# Patient Record
Sex: MALE | Race: WHITE | Employment: FULL TIME | URBAN - METROPOLITAN AREA
[De-identification: names, ages, dates, MRNs, and addresses within clinical notes are randomized per-mention and may not be internally consistent; named-entity substitution may affect disease eponyms.]

---

## 2020-02-20 ENCOUNTER — TELEPHONE (OUTPATIENT)
Dept: SCHEDULING | Facility: IMAGING CENTER | Age: 35
End: 2020-02-20

## 2020-02-24 ENCOUNTER — OFFICE VISIT (OUTPATIENT)
Dept: BEHAVIORAL HEALTH | Facility: CLINIC | Age: 35
End: 2020-02-24
Payer: COMMERCIAL

## 2020-02-24 VITALS
HEIGHT: 69 IN | WEIGHT: 160 LBS | DIASTOLIC BLOOD PRESSURE: 63 MMHG | SYSTOLIC BLOOD PRESSURE: 123 MMHG | HEART RATE: 72 BPM | BODY MASS INDEX: 23.7 KG/M2

## 2020-02-24 DIAGNOSIS — F90.9 ATTENTION DEFICIT HYPERACTIVITY DISORDER (ADHD), UNSPECIFIED ADHD TYPE: ICD-10-CM

## 2020-02-24 PROCEDURE — 99204 OFFICE O/P NEW MOD 45 MIN: CPT | Performed by: PSYCHIATRY & NEUROLOGY

## 2020-02-24 PROCEDURE — 96127 BRIEF EMOTIONAL/BEHAV ASSMT: CPT | Performed by: PSYCHIATRY & NEUROLOGY

## 2020-02-24 SDOH — HEALTH STABILITY: MENTAL HEALTH: HOW OFTEN DO YOU HAVE A DRINK CONTAINING ALCOHOL?: MONTHLY OR LESS

## 2020-02-24 SDOH — HEALTH STABILITY: MENTAL HEALTH: HOW MANY STANDARD DRINKS CONTAINING ALCOHOL DO YOU HAVE ON A TYPICAL DAY?: 1 OR 2

## 2020-02-24 SDOH — HEALTH STABILITY: MENTAL HEALTH: HOW OFTEN DO YOU HAVE 6 OR MORE DRINKS ON ONE OCCASION?: NEVER

## 2020-02-24 ASSESSMENT — PATIENT HEALTH QUESTIONNAIRE - PHQ9
SUM OF ALL RESPONSES TO PHQ QUESTIONS 1-9: 10
5. POOR APPETITE OR OVEREATING: 1 - SEVERAL DAYS
CLINICAL INTERPRETATION OF PHQ2 SCORE: 3

## 2020-02-24 NOTE — PROGRESS NOTES
"INITIAL PSYCHIATRIC EVALUATION      This provider informed the patient their medical records are totally confidential except for the use by other providers involved in their care, or if the patient signs a release, or to report instances of child or elder abuse, or if it is determined they are an immediate risk to harm themselves or others.      CHIEF COMPLAINT  \"I am here for ADHD treatment\"    HISTORY OF PRESENT ILLNESS  Maximus Greenwood is a 34 y.o. old male with history of ADHD comes in today to establish care and for evaluation of ADHD.  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  Patient denies any symptoms consistent with depression, daya or psychosis and denies endorsing suicidal or homicidal ideations.  Patient continues to perseverate how he is having difficulty with every aspect of him attending this college in terms of him frequently leaving the classes due to restlessness, not able to focus beyond 20 minutes, not able to finish home works on time and is fearful of feeling.  Patient reports getting testing done at [facility] during his childhood time but do not have any results of testing now.  Patient understands that he will need to get her neuropsych testing done for ADHD evaluation and get urine drug screen and basic labs done before we can discuss treatment options.    Patient does report struggling with these symptoms and reports them as directly affecting his daily functioning.  Patient did arrive 5 minutes late and left without taking lab prescription despite my frequent education attempt.  I called him and he came back to pick his lab scripts.    Adult ADHD Self-Report Scale (ASRS-v1.1) Symptom    1. How often do you have trouble wrapping up the final details of a project, once the challenging parts have been done?: very often  2. How often do you have difficulty " getting things in order when you have to do a task that requires organization? very often  3. How often do you have problems remembering appointments or obligations? sometime  4. How often do you fidget or squirm with your hands or feet when you have to sit down for a long time? very often  6. How often do you feel overly active and compelled to do things, like you were driven by a motor? often  7. How often do you make careless mistakes when you have to work on a boring or difficult project? often  8. How often do you have difficulty keeping your attention when you are doing boring or repetitive work? very often  9. How often do you have difficulty concentrating on what people say to you, even when they are speaking to you directly? very often  10. How often do you misplace or have difficulty finding things at home or at work? often  11. How often are you distracted by activity or noise around you? rarely   12. How often do you leave your seat in meetings or other situations in which you are expected to remain seated? often  13. How often do you feel restless or fidgety? very often  14. How often do you have difficulty unwinding and relaxing when you have time to yourself? sometime  15. How often do you find yourself talking too much when you are in social situations? often  16. When you’re in a conversation, how often do you find yourself finishing the sentences of the people you are talking to, before they can finish them themselves? rarely  17. How often do you have difficulty waiting your turn in situations when turn taking is required? sometime   18. How often do you interrupt others when they are busy? sometime     Most part of the session was dedicated to educated patient on the process of evaluation before medication treatment can be considered.  Patient in agreement with getting neuropsych testing for ADHD evaluation and bringing any childhood ADHD testing records if available.  Patient also agreed with  getting testing including CBC, CMP and urine drug screen.  Patient reports using cannabis to improve his focus but is in agreement with reducing and stopping cannabis use of medication for ADHD is considered.    PSYCHIATRIC REVIEW OF SYSTEMS: see HPI for depressive symptoms, see HPI for anxeity symptoms, see HPI for manic symptoms, see HPI for psychotic symptoms and see HPI for trauma related symptoms      MEDICAL REVIEW OF SYSTEMS:   Constitutional negative   Eyes negative   Ears/Nose/Mouth/Throat negative   Cardiovascular negative   Respiratory negative   Gastrointestinal negative   Genitourinary negative   Muscular negative   Integumentary negative   Neurological negative   Endocrine negative   Hematologic/Lymphatic negative       PAST PSYCHIATRIC HISTORY  Prior psychiatric hospitalization: denies  Prior Self harm/suicide attempt: denies  Prior Diagnosis: ADHD        CURRENT MEDICATIONS:  Current Outpatient Medications   Medication Sig Dispense Refill   • erythromycin 5 MG/GM Ointment Place 1/4 ribbon to lower lid 3 times daily x 5 days. 1 Tube 0     No current facility-administered medications for this visit.        ALLERGIES:  Patient has no known allergies.      PAST PSYCHIATRIC MEDICATIONS  Ritalin: For stimulant since age 9  Adderall: Ritalin switched to Adderall till age 19 yr: describes as most effective   Focalin: Per patient made him depressed  Strattera: Caused flulike symptoms  Wellbutrin: Not effective for ADHD management  Desipramine    FAMILY HISTORY  Psychiatric diagnosis  none  History of suicide attempts  no  Substance abuse history  none      SUBSTANCE USE HISTORY:  ALCOHOL: occasionally  TOBACCO : denies  CANNABIS 1 bowl: 2-3 days/week  OPIOIDS : denies  PRESCRIPTION MEDICATIONS : denies  OTHERS : denies    History of inpatient/outpatient rehab treatment: none    SOCIAL HISTORY  Education: currently in college ([employer])  in Special Education: no  Intellectual Disability: no  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  No past surgical history on file.      CARE TEAM INFORMATION:  [facility]   · PCP - General, Family Medicine   · Since 4/15/2016   · [phone]      PHYSICAL EXAMINAION:  Vital signs: There were no vitals taken for this visit.  Musculoskeletal: Normal gait.   Abnormal movements: none        MENTAL STATUS EXAMINATION      General:   - Grooming and hygiene: Adequate and Casual,   - Apparent distress: no,   - Behavior: Calm  - Eye Contact:  Good,   - no psychomotor agitation or retardation    - Participation: Active verbal participation  Orientation: Alert and Fully Oriented to person, place and time  Mood: Euthymic  Affect: Full range,  Thought Process: Logical  Thought Content: Denies suicidal or homicidal ideations, intent or plan Within normal limits  Perception: Denies auditory or visual hallucinations. No delusions noted Within normal limits  Attention span and concentration: needs redirections  Speech:Rapid but redirectable  Language: Appropriate   Insight: Good  Judgment: Good  Recent and remote memory: No gross evidence of memory deficits      DEPRESSION SCREENING:  Depression Screen (PHQ-2/PHQ-9) 2/24/2020   PHQ-2 Total Score 3   PHQ-9 Total Score 10       Interpretation of PHQ-9 Total Score   Score Severity   1-4 No Depression   5-9 Mild Depression   10-14 Moderate Depression   15-19 Moderately Severe Depression   20-27 Severe Depression      CURRENT RISK:       Suicidal: Low       Homicidal: Low       Self-Harm: Low       Relapse: Low       Crisis Safety Plan Reviewed Not Indicated      MEDICAL RECORDS/LABS/DIAGNOSTIC TESTS REVIEWED:  [facility] records - reviewed    No Labs/Radiology found.     ASSESSMENT  Patient is a  34 year old male with history of ADHD presented for evaluation of ADHD.  Patient with dominance of inattentive ADHD symptoms during evaluation but will need neuropsych testing before medications can be considered.    DIFFERENTIAL DIAGNOSES  1. ADHD  2. Cannabis abuse      PLAN  1. I reviewed clinical lab tests done in last 1 year. Patient will need following lab test done: CBC with differential, CMP and urine drug screening (patient is given printed information for each)  2. Consult placed for neuropsych testing before medications can be considered.  Patient motivated to bring any childhood ADHD testing records from home if available.  3. Medication options, alternatives (including no medications) and medication risks/benefits/side effects were discussed in detail.  4. The patient was educated to call 911, call the suicide hotline, or go to local ER if having thoughts of suicide or homicide; verbalized understanding.      Return to clinic in 2 weeks or sooner if symptoms worsen    The proposed treatment plan was discussed with the patient who was provided the opportunity to ask questions and make suggestions regarding alternative treatment. Patient verbalized understanding and expressed agreement with the plan.     Total face-to-face time: 50 minutes  More than 50% of face-to-face time was not spent in counseling and coordinating care.    Thank you for allowing me to participate in the care of this patient.    Armando Kline M.D.  02/24/20    CC:   Teodoro Monzon, A.P.N.    This note was created using voice recognition software (Dragon). The accuracy of the dictation is limited by the abilities of the software. I have reviewed the note prior to signing, however some errors in grammar and context are still possible. If you have any questions related to this note please do not hesitate to contact our office.

## 2020-03-16 ENCOUNTER — HOSPITAL ENCOUNTER (OUTPATIENT)
Dept: LAB | Facility: MEDICAL CENTER | Age: 35
End: 2020-03-16
Attending: PSYCHIATRY & NEUROLOGY
Payer: COMMERCIAL

## 2020-03-16 ENCOUNTER — OFFICE VISIT (OUTPATIENT)
Dept: BEHAVIORAL HEALTH | Facility: CLINIC | Age: 35
End: 2020-03-16
Payer: COMMERCIAL

## 2020-03-16 DIAGNOSIS — F90.2 ATTENTION DEFICIT HYPERACTIVITY DISORDER (ADHD), COMBINED TYPE: ICD-10-CM

## 2020-03-16 DIAGNOSIS — F34.0 CYCLOTHYMIA: ICD-10-CM

## 2020-03-16 DIAGNOSIS — F90.9 ATTENTION DEFICIT HYPERACTIVITY DISORDER (ADHD), UNSPECIFIED ADHD TYPE: ICD-10-CM

## 2020-03-16 LAB
ALBUMIN SERPL BCP-MCNC: 4.6 G/DL (ref 3.2–4.9)
ALBUMIN/GLOB SERPL: 1.6 G/DL
ALP SERPL-CCNC: 60 U/L (ref 30–99)
ALT SERPL-CCNC: 16 U/L (ref 2–50)
AMPHET UR QL SCN: NEGATIVE
ANION GAP SERPL CALC-SCNC: 8 MMOL/L (ref 7–16)
AST SERPL-CCNC: 15 U/L (ref 12–45)
BARBITURATES UR QL SCN: NEGATIVE
BASOPHILS # BLD AUTO: 0.7 % (ref 0–1.8)
BASOPHILS # BLD: 0.04 K/UL (ref 0–0.12)
BENZODIAZ UR QL SCN: NEGATIVE
BILIRUB SERPL-MCNC: 0.7 MG/DL (ref 0.1–1.5)
BUN SERPL-MCNC: 12 MG/DL (ref 8–22)
BZE UR QL SCN: NEGATIVE
CALCIUM SERPL-MCNC: 10.2 MG/DL (ref 8.5–10.5)
CANNABINOIDS UR QL SCN: POSITIVE
CHLORIDE SERPL-SCNC: 103 MMOL/L (ref 96–112)
CO2 SERPL-SCNC: 26 MMOL/L (ref 20–33)
CREAT SERPL-MCNC: 1.02 MG/DL (ref 0.5–1.4)
EOSINOPHIL # BLD AUTO: 0.22 K/UL (ref 0–0.51)
EOSINOPHIL NFR BLD: 3.8 % (ref 0–6.9)
ERYTHROCYTE [DISTWIDTH] IN BLOOD BY AUTOMATED COUNT: 39.7 FL (ref 35.9–50)
GLOBULIN SER CALC-MCNC: 2.9 G/DL (ref 1.9–3.5)
GLUCOSE SERPL-MCNC: 100 MG/DL (ref 65–99)
HCT VFR BLD AUTO: 51.8 % (ref 42–52)
HGB BLD-MCNC: 17.8 G/DL (ref 14–18)
IMM GRANULOCYTES # BLD AUTO: 0.01 K/UL (ref 0–0.11)
IMM GRANULOCYTES NFR BLD AUTO: 0.2 % (ref 0–0.9)
LYMPHOCYTES # BLD AUTO: 1.96 K/UL (ref 1–4.8)
LYMPHOCYTES NFR BLD: 34 % (ref 22–41)
MCH RBC QN AUTO: 30.5 PG (ref 27–33)
MCHC RBC AUTO-ENTMCNC: 34.4 G/DL (ref 33.7–35.3)
MCV RBC AUTO: 88.9 FL (ref 81.4–97.8)
METHADONE UR QL SCN: NEGATIVE
MONOCYTES # BLD AUTO: 0.44 K/UL (ref 0–0.85)
MONOCYTES NFR BLD AUTO: 7.6 % (ref 0–13.4)
NEUTROPHILS # BLD AUTO: 3.09 K/UL (ref 1.82–7.42)
NEUTROPHILS NFR BLD: 53.7 % (ref 44–72)
NRBC # BLD AUTO: 0 K/UL
NRBC BLD-RTO: 0 /100 WBC
OPIATES UR QL SCN: NEGATIVE
OXYCODONE UR QL SCN: NEGATIVE
PCP UR QL SCN: NEGATIVE
PLATELET # BLD AUTO: 289 K/UL (ref 164–446)
PMV BLD AUTO: 9.6 FL (ref 9–12.9)
POTASSIUM SERPL-SCNC: 4.3 MMOL/L (ref 3.6–5.5)
PROPOXYPH UR QL SCN: NEGATIVE
PROT SERPL-MCNC: 7.5 G/DL (ref 6–8.2)
RBC # BLD AUTO: 5.83 M/UL (ref 4.7–6.1)
SODIUM SERPL-SCNC: 137 MMOL/L (ref 135–145)
WBC # BLD AUTO: 5.8 K/UL (ref 4.8–10.8)

## 2020-03-16 PROCEDURE — 85025 COMPLETE CBC W/AUTO DIFF WBC: CPT

## 2020-03-16 PROCEDURE — 96130 PSYCL TST EVAL PHYS/QHP 1ST: CPT | Performed by: PSYCHOLOGIST

## 2020-03-16 PROCEDURE — 96131 PSYCL TST EVAL PHYS/QHP EA: CPT | Performed by: PSYCHOLOGIST

## 2020-03-16 PROCEDURE — 80307 DRUG TEST PRSMV CHEM ANLYZR: CPT

## 2020-03-16 PROCEDURE — 96137 PSYCL/NRPSYC TST PHY/QHP EA: CPT | Performed by: PSYCHOLOGIST

## 2020-03-16 PROCEDURE — 96136 PSYCL/NRPSYC TST PHY/QHP 1ST: CPT | Performed by: PSYCHOLOGIST

## 2020-03-16 PROCEDURE — 80053 COMPREHEN METABOLIC PANEL: CPT

## 2020-03-16 PROCEDURE — 36415 COLL VENOUS BLD VENIPUNCTURE: CPT

## 2020-03-19 NOTE — PROGRESS NOTES
PSYCHIATRY FOLLOW-UP NOTE      Name: Maximus Greenwood  MRN: [id number]  : 1985  Age: 34 y.o.  Date of assessment: 3/19/2020  PCP: F[facility]tr[facility]  Persons in attendance: Patient  Total face-to-face time: 30 minutes    REASON FOR VISIT/CHIEF COMPLAINT (as stated by Patient):  Maximus Greenwood is a 34 y.o., White male, attending follow-up appointment for inattention and hyperactivity management.      HISTORY OF PRESENT ILLNESS:  Maximus Greenwood is a 34 y.o. old male comes in today for follow up, was last seen for an initial evaluation 2020 (3 weeks ago).  Patient initially came with the goal of getting Adderall for ADHD treatment.  Patient has agreed with plan of getting neuropsychological testing and basic lab work (including CBC, CMP and urine drug screening) done.    Patient has performed these activities and his neuropsychiatric testing results are available: We discussed how the tests tested are showing presence of underlying cyclothymia with no definite diagnosis for ADHD.  We discussed the importance of managing mood first by adding mood stabilizer first and assessing if symptoms of inattention and hyperactivity improves.  Discussed the impact of initiating stimulant alone and increasing the risk of further mood destabilization and worsening inattention and hyperactivity symptoms.  Patient initially got angry and loud for not getting the prescription for stimulant.  Patient later apologized and is in agreement with considering mood stabilizer first and then considering stimulant if indicated.  Patient is concerned that he will fail this semester.  Patient was again educated extensively on importance of mood stabilization first otherwise we carry the risk of destabilizing mood further. Patient is currently denying symptoms of depression and denies endorsing suicidal or homicidal ideation.    His PHQ 9 today is 5 indicating no depression.  His NELY 7 is 5 indicating no anxiety.    Discussed  about lithium in detail and importance of taking this medication on a daily basis and checking lithium level after 1 week of consistent dosing.  Patient is educated on risk of lithium toxicity with drug interaction and taking more than recommended medications.  Patient continues to verbalize that this medication will not help him.  Patient again educated on importance of mood stabilization first and impact of prescribing stimulant with cyclothymia.      CURRENT MEDICATIONS:  No current outpatient medications on file.     No current facility-administered medications for this visit.        MEDICAL HISTORY  Past Medical History:   Diagnosis Date   • ADD (attention deficit disorder)      No past surgical history on file.      PAST PSYCHIATRIC HISTORY  Prior psychiatric hospitalization: denies  Prior Self harm/suicide attempt: denies  Prior Diagnosis: ADHD    PAST PSYCHIATRIC MEDICATIONS  Ritalin: For stimulant since age 9  Adderall: Ritalin switched to Adderall till age 19 yr: describes as most effective   Focalin: Per patient made him depressed  Strattera: Caused flulike symptoms  Wellbutrin: Not effective for ADHD management  Desipramine     FAMILY HISTORY  Psychiatric diagnosis  none  History of suicide attempts  no  Substance abuse history  none     SUBSTANCE USE HISTORY:  ALCOHOL: occasionally  TOBACCO : denies  CANNABIS 1 bowl: 2-3 days/week  OPIOIDS : denies  PRESCRIPTION MEDICATIONS : denies  OTHERS : denies  History of inpatient/outpatient rehab treatment: none     SOCIAL HISTORY  Education: currently in college (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  Relationship:  for 2 years  Kids: no  Current living situation: lives with wife  Current/past legal issues: denies  History of emotional/physical/sexual abuse - no   History: no      REVIEW OF SYSTEMS:        Constitutional negative   Eyes negative   Ears/Nose/Mouth/Throat  "negative   Cardiovascular negative   Respiratory negative   Gastrointestinal negative   Genitourinary negative   Muscular negative   Integumentary negative   Neurological negative   Endocrine negative   Hematologic/Lymphatic negative     PHYSICAL EXAMINAION:  Vital signs: /70 (BP Location: Left arm, Patient Position: Sitting)   Pulse 72   Ht 1.753 m (5' 9\")   Wt 73.5 kg (162 lb)   BMI 23.92 kg/m²   Musculoskeletal: Normal gait.   Abnormal movements: none      MENTAL STATUS EXAMINATION      General:   - Grooming and hygiene: Disheveled,   - Apparent distress: none,   - Behavior: Tense  - Eye Contact:  Good,   - no psychomotor agitation or retardation    - Participation: Active verbal participation  Orientation: Alert and Fully Oriented to person, place and time  Mood: Anxious  Affect: Full range,  Thought Process: Goal-directed  Thought Content: Denies suicidal or homicidal ideations, intent or plan Within normal limits  Perception: Denies auditory or visual hallucinations. No delusions noted Within normal limits  Attention span and concentration: Intact   Speech:Rate within normal limits and Volume within normal limits  Language: Appropriate   Insight: Limited  Judgment: Adequate  Recent and remote memory: No gross evidence of memory deficits        DEPRESSION SCREENING:  Depression Screen (PHQ-2/PHQ-9) 2/24/2020 3/20/2020   PHQ-2 Total Score 3 1   PHQ-9 Total Score 10 5       Interpretation of PHQ-9 Total Score   Score Severity   1-4 No Depression   5-9 Mild Depression   10-14 Moderate Depression   15-19 Moderately Severe Depression   20-27 Severe Depression    CURRENT RISK:       Suicidal: Low       Homicidal: Low       Self-Harm: Low       Relapse: Low       Crisis Safety Plan Reviewed Not Indicated         MEDICAL RECORDS/LABS/DIAGNOSTIC TESTS REVIEWED:    CBC with Diff:  Component      Latest Ref Rng & Units 3/16/2020          11:21 AM   WBC      4.8 - 10.8 K/uL 5.8   RBC      4.70 - 6.10 M/uL 5.83 "   Hemoglobin      14.0 - 18.0 g/dL 17.8   Hematocrit      42.0 - 52.0 % 51.8   MCV      81.4 - 97.8 fL 88.9   MCH      27.0 - 33.0 pg 30.5   MCHC      33.7 - 35.3 g/dL 34.4   RDW      35.9 - 50.0 fL 39.7   Platelet Count      164 - 446 K/uL 289   MPV      9.0 - 12.9 fL 9.6   Neutrophils-Polys      44.00 - 72.00 % 53.70   Lymphocytes      22.00 - 41.00 % 34.00   Monocytes      0.00 - 13.40 % 7.60   Eosinophils      0.00 - 6.90 % 3.80   Basophils      0.00 - 1.80 % 0.70   Immature Granulocytes      0.00 - 0.90 % 0.20   Nucleated RBC      /100 WBC 0.00   Neutrophils (Absolute)      1.82 - 7.42 K/uL 3.09   Lymphs (Absolute)      1.00 - 4.80 K/uL 1.96   Monos (Absolute)      0.00 - 0.85 K/uL 0.44   Eos (Absolute)      0.00 - 0.51 K/uL 0.22   Baso (Absolute)      0.00 - 0.12 K/uL 0.04   Immature Granulocytes (abs)      0.00 - 0.11 K/uL 0.01   NRBC (Absolute)      K/uL 0.00     CMP:  Component      Latest Ref Rng & Units 3/16/2020          11:21 AM   Sodium      135 - 145 mmol/L 137   Potassium      3.6 - 5.5 mmol/L 4.3   Chloride      96 - 112 mmol/L 103   Co2      20 - 33 mmol/L 26   Anion Gap      7.0 - 16.0 8.0   Glucose      65 - 99 mg/dL 100 (H)   Bun      8 - 22 mg/dL 12   Creatinine      0.50 - 1.40 mg/dL 1.02   Calcium      8.5 - 10.5 mg/dL 10.2   AST(SGOT)      12 - 45 U/L 15   ALT(SGPT)      2 - 50 U/L 16   Alkaline Phosphatase      30 - 99 U/L 60   Total Bilirubin      0.1 - 1.5 mg/dL 0.7   Albumin      3.2 - 4.9 g/dL 4.6   Total Protein      6.0 - 8.2 g/dL 7.5   Globulin      1.9 - 3.5 g/dL 2.9   A-G Ratio      g/dL 1.6     Urine Drug Screen:  Component      Latest Ref Rng & Units 3/16/2020          11:21 AM   Amphetamines Urine      Negative Negative   Barbiturates      Negative Negative   Benzodiazepines      Negative Negative   Cocaine Metabolite      Negative Negative   Methadone      Negative Negative   Opiates      Negative Negative   Oxycodone      Negative Negative   Phencyclidine -Pcp      Negative  Negative   Propoxyphene      Negative Negative   Cannabinoid Metab      Negative Positive (A)       [facility] records -   [id number]   No data.      DIAGNOSTIC IMPRESSION(S):  · Mood disorder rule out bipolar 2 disorder versus cyclothymia  · Rule out comorbid ADHD  · Cannabis abuse      ASSESSMENT & PLAN:  (1) Mood disorder rule out Bipolar 2 disorder versus cyclothymia  • Add Lithium 600 mg daily after dinner for mood stabilization. Patient is given 1 month supply with 1 refill.  • Get lithium level after 1 week of lithium dosing.  Patient educated on importance of timeline for lithium level monitoring.  • Medication options, alternatives (including no medications) and medication risks/benefits/side effects were discussed in detail.  • The patient was advised to call, message provider on [employer], or come in to the clinic if symptoms worsen or if any future questions/issues regarding their medications arise; the patient verbalized understanding and agreement.  • The patient was educated to call 911, call the suicide hotline, or go to local ER if having thoughts of suicide or homicide; verbalized understanding.    (2) rule out co-morbid ADHD:  · Plan is mood stabilization first and assessing if symptoms of inattention and hyperactivity improves and plan further treatment accordingly.    (3) Cannabis abuse:  · Patient motivated to consider reduction and discontinuation of cannabis use.    Return to clinic in 4 weeks or sooner if symptoms worsen    The proposed treatment plan was discussed with the patient who was provided the opportunity to ask questions and make suggestions regarding alternative treatment. Patient verbalized understanding and expressed agreement with the plan.     More than 50% of face-to-face time was not spent in counseling and coordinating care.   Topics addressed in psychotherapy include:   · Extensive education given on importance of mood stabilization with cyclothymia.      (If greater than 16  minutes, add 08827 code)     Armando Kline M.D.  Armando Kline M.D.  03/20/20    This note was created using voice recognition software (Dragon). The accuracy of the dictation is limited by the abilities of the software. I have reviewed the note prior to signing, however some errors in grammar and context are still possible. If you have any questions related to this note please do not hesitate to contact our office.

## 2020-03-20 ENCOUNTER — OFFICE VISIT (OUTPATIENT)
Dept: BEHAVIORAL HEALTH | Facility: CLINIC | Age: 35
End: 2020-03-20
Payer: COMMERCIAL

## 2020-03-20 VITALS
HEART RATE: 72 BPM | HEIGHT: 69 IN | SYSTOLIC BLOOD PRESSURE: 110 MMHG | BODY MASS INDEX: 23.99 KG/M2 | DIASTOLIC BLOOD PRESSURE: 70 MMHG | WEIGHT: 162 LBS

## 2020-03-20 DIAGNOSIS — F90.2 ATTENTION DEFICIT HYPERACTIVITY DISORDER (ADHD), COMBINED TYPE: ICD-10-CM

## 2020-03-20 DIAGNOSIS — F34.0 CYCLOTHYMIA: Primary | ICD-10-CM

## 2020-03-20 PROCEDURE — 96127 BRIEF EMOTIONAL/BEHAV ASSMT: CPT | Performed by: PSYCHIATRY & NEUROLOGY

## 2020-03-20 PROCEDURE — 99214 OFFICE O/P EST MOD 30 MIN: CPT | Performed by: PSYCHIATRY & NEUROLOGY

## 2020-03-20 RX ORDER — LITHIUM CARBONATE 600 MG/1
600 CAPSULE ORAL
Qty: 90 CAP | Refills: 1 | Status: SHIPPED | OUTPATIENT
Start: 2020-03-20 | End: 2020-04-15

## 2020-03-20 RX ORDER — ALBUTEROL SULFATE 90 UG/1
2 AEROSOL, METERED RESPIRATORY (INHALATION) EVERY 8 HOURS PRN
Qty: 2 INHALER | Refills: 1 | Status: SHIPPED | OUTPATIENT
Start: 2020-03-20 | End: 2021-02-21

## 2020-03-20 ASSESSMENT — FIBROSIS 4 INDEX: FIB4 SCORE: 0.42

## 2020-03-20 ASSESSMENT — PATIENT HEALTH QUESTIONNAIRE - PHQ9
CLINICAL INTERPRETATION OF PHQ2 SCORE: 1
SUM OF ALL RESPONSES TO PHQ QUESTIONS 1-9: 5
5. POOR APPETITE OR OVEREATING: 0 - NOT AT ALL

## 2020-03-20 ASSESSMENT — ANXIETY QUESTIONNAIRES
6. BECOMING EASILY ANNOYED OR IRRITABLE: NOT AT ALL
7. FEELING AFRAID AS IF SOMETHING AWFUL MIGHT HAPPEN: MORE THAN HALF THE DAYS
5. BEING SO RESTLESS THAT IT IS HARD TO SIT STILL: NOT AT ALL
GAD7 TOTAL SCORE: 5
1. FEELING NERVOUS, ANXIOUS, OR ON EDGE: SEVERAL DAYS
4. TROUBLE RELAXING: NOT AT ALL
3. WORRYING TOO MUCH ABOUT DIFFERENT THINGS: SEVERAL DAYS
2. NOT BEING ABLE TO STOP OR CONTROL WORRYING: SEVERAL DAYS

## 2020-04-10 ENCOUNTER — HOSPITAL ENCOUNTER (OUTPATIENT)
Dept: LAB | Facility: MEDICAL CENTER | Age: 35
End: 2020-04-10
Attending: PSYCHIATRY & NEUROLOGY
Payer: COMMERCIAL

## 2020-04-10 ENCOUNTER — OFFICE VISIT (OUTPATIENT)
Dept: URGENT CARE | Facility: PHYSICIAN GROUP | Age: 35
End: 2020-04-10
Payer: COMMERCIAL

## 2020-04-10 VITALS
BODY MASS INDEX: 24.25 KG/M2 | OXYGEN SATURATION: 96 % | RESPIRATION RATE: 16 BRPM | WEIGHT: 160 LBS | TEMPERATURE: 97.6 F | DIASTOLIC BLOOD PRESSURE: 90 MMHG | HEART RATE: 88 BPM | HEIGHT: 68 IN | SYSTOLIC BLOOD PRESSURE: 124 MMHG

## 2020-04-10 DIAGNOSIS — L03.031 CELLULITIS OF TOE OF RIGHT FOOT: ICD-10-CM

## 2020-04-10 DIAGNOSIS — F34.0 CYCLOTHYMIA: ICD-10-CM

## 2020-04-10 LAB — LITHIUM SERPL-MCNC: 0.6 MMOL/L (ref 0.6–1.2)

## 2020-04-10 PROCEDURE — 99214 OFFICE O/P EST MOD 30 MIN: CPT | Performed by: NURSE PRACTITIONER

## 2020-04-10 PROCEDURE — 36415 COLL VENOUS BLD VENIPUNCTURE: CPT

## 2020-04-10 PROCEDURE — 80178 ASSAY OF LITHIUM: CPT

## 2020-04-10 RX ORDER — CEPHALEXIN 500 MG/1
500 CAPSULE ORAL 4 TIMES DAILY
Qty: 28 CAP | Refills: 0 | Status: SHIPPED | OUTPATIENT
Start: 2020-04-10 | End: 2020-04-17

## 2020-04-10 ASSESSMENT — ENCOUNTER SYMPTOMS
PALPITATIONS: 0
TINGLING: 0
HEARTBURN: 0
SHORTNESS OF BREATH: 0
MYALGIAS: 0
DIARRHEA: 0
CHILLS: 0
HEADACHES: 0
DIZZINESS: 0
MEMORY LOSS: 0
COUGH: 0
FEVER: 0
CONSTIPATION: 0
VOMITING: 0
BACK PAIN: 0
NAUSEA: 0
ORTHOPNEA: 0
WHEEZING: 0
SORE THROAT: 0

## 2020-04-10 ASSESSMENT — PAIN SCALES - GENERAL: PAINLEVEL: 6=MODERATE PAIN

## 2020-04-10 ASSESSMENT — FIBROSIS 4 INDEX: FIB4 SCORE: 0.42

## 2020-04-10 NOTE — PROGRESS NOTES
"Subjective:      Maximus Greenwood is a 34 y.o. male who presents with Toe Pain (L small toe, swollen, x1 week)            Patient presents to urgent care with complaint of fifth right toe pain that started approximately 4 days ago.  Patient reports no traumatic injury to the toe.  He reports that he had a crack underneath his toe and since that time he has had increased pain, swelling and tenderness to the toe.  He denies any fevers, chills, numbness, tingling.  He has not tried anything for this.  Walking aggravates the pain.  There are no alleviating factors.      Review of Systems   Constitutional: Negative for chills and fever.   HENT: Negative for ear pain and sore throat.    Respiratory: Negative for cough, shortness of breath and wheezing.    Cardiovascular: Negative for chest pain, palpitations, orthopnea and leg swelling.   Gastrointestinal: Negative for constipation, diarrhea, heartburn, nausea and vomiting.   Musculoskeletal: Negative for back pain, joint pain and myalgias.        Right 5th toe pain and swelling.   Skin: Negative for rash.   Neurological: Negative for dizziness, tingling and headaches.   Psychiatric/Behavioral: Negative for memory loss and suicidal ideas.   All other systems reviewed and are negative.         Objective:     /90   Pulse 88   Temp 36.4 °C (97.6 °F) (Temporal)   Resp 16   Ht 1.727 m (5' 8\")   Wt 72.6 kg (160 lb)   SpO2 96%   BMI 24.33 kg/m²      Physical Exam  Musculoskeletal:        Feet:    Feet:      Right foot:      Skin integrity: Erythema, warmth, dry skin and fissure present.      Toenail Condition: Right toenails are normal.      Left foot:      Skin integrity: Skin integrity normal.                 Assessment/Plan:       1. Cellulitis of toe of right foot  Differential diagnosis, natural history, supportive care, and indications for immediate follow-up discussed at length.     - cephALEXin (KEFLEX) 500 MG Cap; Take 1 Cap by mouth 4 times a day for 7 " days.  Dispense: 28 Cap; Refill: 0    Instructed to return to  or nearest emergency department if symptoms fail to improve, for any change in condition, further concerns, or new concerning symptoms.  Patient states understanding of the plan of care and discharge instructions.

## 2020-04-14 NOTE — PROGRESS NOTES
This encounter was conducted via Zoom .   Verbal consent was obtained. Patient's identity was verified.    PSYCHIATRY FOLLOW-UP NOTE      Name: Maximus Greenwood  MRN: [id number]  : 1985  Age: 34 y.o.  Date of assessment: 2020  PCP: FANY Beltran  Persons in attendance: Patient  Total face-to-face time: 15 minutes    REASON FOR VISIT/CHIEF COMPLAINT (as stated by Patient):  Maximus Greenwood is a 34 y.o., White male, attending follow-up appointment for mood and inattention management.      HISTORY OF PRESENT ILLNESS:  Maximus Greenwood is a 34 y.o. old male seen today for follow up. He was last seen on 3/20/20 and following treatment planning was recommended:  · Add Lithium 600 mg daily after dinner for mood stabilization. Patient is given 1 month supply with 1 refill.  · Get lithium level after 1 week of lithium dosing.  Patient educated on importance of timeline for lithium level monitoring.  · Plan is mood stabilization first and assessing if symptoms of inattention and hyperactivity improves and plan further treatment accordingly.    His recent Lithium level is 0.6.  He is compliant with lithium and reports having mild shakes which are not intrusive or affecting his ability to function on a daily basis.  Reports mood as stable and describes his focus and attention not improving on lithium.  He did agreed with plan of reducing lithium from 600 mg dose to 450 mg dose and considering the addition of Adderall for inattention and hyperactivity management.  Discussed the plan of initiating Adderall at low-dose of 10 mg and monitoring closely for not only improvement in inattention and hyperactivity but also for any worsening of mood symptoms.  Patient reports understanding and also agreed to sign the controlled medication consent form on Friday, when he will come to the clinic to pick this medication prescription.  She is denying suicidal or homicidal ideation and reports doing well in terms of  having a job in this coronavirus crisis.    He was again educated on risk of lithium toxicity with dehydration and over-the-counter medication.  He verbalized understanding and is mindful of these drug interactions.  Is currently denying any signs or symptoms consistent with lithium toxicity.      RESPONSE TO TREATMENT:  Slow improvement      MEDICATION SIDE EFFECTS:  Shakes from tremors      CURRENT MEDICATIONS:  Current Outpatient Medications   Medication Sig Dispense Refill   • cephALEXin (KEFLEX) 500 MG Cap Take 1 Cap by mouth 4 times a day for 7 days. 28 Cap 0   • lithium 600 MG capsule Take 1 Cap by mouth ONE-HALF HOUR AFTER DINNER. 90 Cap 1   • albuterol (VENTOLIN HFA) 108 (90 Base) MCG/ACT Aero Soln inhalation aerosol Inhale 2 Puffs by mouth every 8 hours as needed for Shortness of Breath. 2 Inhaler 1     No current facility-administered medications for this visit.          MEDICAL HISTORY  Past Medical History:   Diagnosis Date   • ADD (attention deficit disorder)      No past surgical history on file.    PAST PSYCHIATRIC HISTORY  Prior psychiatric hospitalization: denies  Prior Self harm/suicide attempt: denies  Prior Diagnosis: ADHD     PAST PSYCHIATRIC MEDICATIONS  Ritalin: For stimulant since age 9  Adderall: Ritalin switched to Adderall till age 19 yr: describes as most effective   Focalin: Per patient made him depressed  Strattera: Caused flulike symptoms  Wellbutrin: Not effective for ADHD management  Desipramine     FAMILY HISTORY  Psychiatric diagnosis  none  History of suicide attempts  no  Substance abuse history  none     SUBSTANCE USE HISTORY:  ALCOHOL: occasionally  TOBACCO : denies  CANNABIS 1 bowl: 2-3 days/week  OPIOIDS : denies  PRESCRIPTION MEDICATIONS : denies  OTHERS : denies  History of inpatient/outpatient rehab treatment: none     SOCIAL HISTORY  Education: currently in college (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6 kg (160 lb) Comment: Patient stated  BMI 23.63 kg/m²   Musculoskeletal: Normal gait.   Abnormal movements: none noted (reports mild tremors on hand extension)      MENTAL STATUS EXAMINATION      General:   - Grooming and hygiene: Casual,   - Apparent distress: none,   - Behavior: Calm  - Eye Contact:  Good,   - no psychomotor agitation or retardation    - Participation: Active verbal participation  Orientation: Alert and Fully Oriented to person, place and time  Mood: Euthymic  Affect: Flexible,  Thought Process: Logical and Goal-directed  Thought Content: Denies suicidal or homicidal ideations, intent or plan Within normal limits  Perception: Denies auditory or visual hallucinations. No delusions noted Within normal limits  Attention span and concentration: Intact   Speech:Rate within normal limits  Language: Appropriate   Insight: Adequate  Judgment: Adequate  Recent and remote memory: No gross evidence of memory deficits        DEPRESSION SCREENING:  Depression Screen (PHQ-2/PHQ-9) 2/24/2020 3/20/2020   PHQ-2 Total Score 3 1   PHQ-9 Total Score 10 5       Interpretation of PHQ-9 Total Score   Score Severity   1-4 No Depression   5-9 Mild Depression   10-14 Moderate Depression   15-19 Moderately Severe Depression   20-27 Severe Depression    CURRENT RISK:       Suicidal: Low       " Homicidal: Low       Self-Harm: Low       Relapse: Low       Crisis Safety Plan Reviewed Not Indicated      MEDICAL RECORDS/LABS/DIAGNOSTIC TESTS REVIEWED:    Component      Latest Ref Rng & Units 4/10/2020          12:14 PM   Lithium      0.6 - 1.2 mmol/L 0.6       [facility] records -   [id number]   No data.       DIAGNOSTIC IMPRESSION(S):  · Mood disorder rule out bipolar 2 disorder versus cyclothymia  · Rule out comorbid ADHD  · Cannabis abuse        ASSESSMENT & PLAN:  (1) Mood disorder rule out Bipolar 2 disorder versus cyclothymia  · Reduce Lithium to 450 mg daily after dinner for mood stabilization for shakes at 600 mg dose. Patient was given 1 month supply with 1 refill.  · Medication options, alternatives (including no medications) and medication risks/benefits/side effects were discussed in detail.  · The patient was advised to call, message provider on [employer], or come in to the clinic if symptoms worsen or if any future questions/issues regarding their medications arise; the patient verbalized understanding and agreement.  · The patient was educated to call 911, call the suicide hotline, or go to local ER if having thoughts of suicide or homicide; verbalized understanding.     (2) ADHD:  · Add Adderall XR 10 mg daily for inattention and hyperactivity management.  He will pick the prescription from clinic on Friday (4/17/19)     (3) Cannabis abuse:  · Patient motivated to consider reduction and discontinuation of cannabis use.      Return to clinic in 4 weeks or sooner if symptoms worsen.  Next Appointment: May 20 at 10 am.    The proposed treatment plan was discussed with the patient who was provided the opportunity to ask questions and make suggestions regarding alternative treatment. Patient verbalized understanding and expressed agreement with the plan.     Armando Kline M.D.  04/15/20    This note was created using voice recognition software (Dragon). The accuracy of the dictation is limited by the  abilities of the software. I have reviewed the note prior to signing, however some errors in grammar and context are still possible. If you have any questions related to this note please do not hesitate to contact our office.

## 2020-04-15 ENCOUNTER — TELEMEDICINE (OUTPATIENT)
Dept: BEHAVIORAL HEALTH | Facility: CLINIC | Age: 35
End: 2020-04-15
Payer: COMMERCIAL

## 2020-04-15 VITALS — BODY MASS INDEX: 23.7 KG/M2 | HEIGHT: 69 IN | WEIGHT: 160 LBS

## 2020-04-15 DIAGNOSIS — F34.0 CYCLOTHYMIC DISORDER: ICD-10-CM

## 2020-04-15 DIAGNOSIS — F90.2 ATTENTION DEFICIT HYPERACTIVITY DISORDER (ADHD), COMBINED TYPE: ICD-10-CM

## 2020-04-15 PROCEDURE — 99213 OFFICE O/P EST LOW 20 MIN: CPT | Mod: 95,CR | Performed by: PSYCHIATRY & NEUROLOGY

## 2020-04-15 RX ORDER — LITHIUM CARBONATE 450 MG
450 TABLET, EXTENDED RELEASE ORAL
Qty: 30 TAB | Refills: 1 | Status: SHIPPED | OUTPATIENT
Start: 2020-04-15 | End: 2020-05-20 | Stop reason: SDUPTHER

## 2020-04-15 ASSESSMENT — FIBROSIS 4 INDEX: FIB4 SCORE: 0.42

## 2020-04-17 RX ORDER — DEXTROAMPHETAMINE SACCHARATE, AMPHETAMINE ASPARTATE MONOHYDRATE, DEXTROAMPHETAMINE SULFATE AND AMPHETAMINE SULFATE 2.5; 2.5; 2.5; 2.5 MG/1; MG/1; MG/1; MG/1
10 CAPSULE, EXTENDED RELEASE ORAL EVERY MORNING
Qty: 30 CAP | Refills: 0 | Status: SHIPPED | OUTPATIENT
Start: 2020-04-17 | End: 2020-05-17

## 2020-05-13 ENCOUNTER — TELEMEDICINE (OUTPATIENT)
Dept: BEHAVIORAL HEALTH | Facility: CLINIC | Age: 35
End: 2020-05-13
Payer: COMMERCIAL

## 2020-05-13 DIAGNOSIS — F90.2 ATTENTION DEFICIT HYPERACTIVITY DISORDER (ADHD), COMBINED TYPE: ICD-10-CM

## 2020-05-13 DIAGNOSIS — F34.0 CYCLOTHYMIC DISORDER: ICD-10-CM

## 2020-05-13 PROCEDURE — 90834 PSYTX W PT 45 MINUTES: CPT | Mod: 95,CR | Performed by: PSYCHOLOGIST

## 2020-05-13 NOTE — BH THERAPY
[facility]  Therapy Progress Note  Met with the patient per their request via (HIPPA compliant) Zoom video conference to accommodate state imposed restrictions on social contact due to the COVID-19 pandemic.      Patient Name: Maximus Greenwood  Patient MRN: [id number]  Today's Date: 5/13/2020     Type of session:Individual psychotherapy  Length of session: 38 minutes  Persons in attendance:Patient    Subjective/New Info: The patient ID/Chief Complaint:  The patient is a 34 year old male, , .  The patient self-referred and voluntarily presented for an individual intake to address.  Reviewed limits of confidentiality and [facility] policies; patient expressed understanding and agreed to voluntarily proceed with evaluation and treatment.    Interval History:   Session Focus: The patient's estimated global assessment of functioning indicates a mild level of difficulty with some problems in relationships, work, or school functioning. The patient is nonetheless functioning well and has some significant relationships.  Continue to provide patient education about cyclothymia versus bipolar 2 disorder.  The patient was able to identify some goals specifically related to planning and execution.  He denies concerns related to impulsivity.  The patient reported that he will complete the semester and that he only had to drop out of 1 class as a result of it being too difficult and a virtual format.      Therapeutic Intervention: Cognitive Behavioral Therapy      Planned Intervention: Problem solving and decision-making skills      Objective/Observations:    Patient did not present in acute distress. Patient was appropriately groomed. Patient was alert and oriented x4. Eye contact was appropriate. No abnormalities in attention or concentration were noted. No abnormalities of movement present; psychomotor activity was normal. Speech was fluent and regular in rhythm, rate, volume,  and tone. Thought processes linear, logical and goal directed. There was no evidence of thought disorder. No auditory or visual hallucinations. Long and short term memory appeared to be intact. Insight, judgment, and impulse control were deemed to be good.  Reported mood was “concerned.” Affect was full-ranging and appropriate to thought content and conversation.  Patient denied past and current suicidal and homicidal ideation in plan, intent, and preparatory behavior.      Diagnoses:   1. Cyclothymia    2. Attention deficit hyperactivity disorder (ADHD), combined type         The patient denied any suicide-related ideation and/or behaviors and intent/plan, denied thoughts about death and dying both in session and during the period since last appointment, or past 2 weeks.    Risk Level: Not Currently at Clinically Significant Risk  Hospitalization is not deemed necessary at this time as the patient does not present a clear or imminent danger to self or others. No indication for pursuing higher level of care. Outpatient management is currently most appropriate and least restrictive level of care.    Current risk:   SUICIDE: Low   Homicide: Not applicable   Self-harm: Not applicable   Relapse: Not applicable   Other:    Safety Plan reviewed? No   If evidence of imminent risk is present, intervention/plan:     Treatment Goal(s)/Objective(s) addressed:     Goal: Reduce symptoms of hypomania and/or daya  Explore feelings and thoughts about self, his or her own abilities, and future plans.  Stop or reduce self destructive behaviors such as promiscuity, substance abuse, and the expression of overt hostility or aggression.  Learn to speak more slowly and be more subject oriented.  Learn to dress and groom in a less attention grabbing manner.  Increase control over thoughts and a slower thinking process.  Increase ability to stay focused on a single activity to completion.  Increase an understanding that behavior and  judgment are under poor control during episodes.  Increase acceptance of the need for ongoing supportive treatment and medication to reduce or eliminate destructive, manic swings.      Progress toward Treatment Goals: No change    Plan:  1) The patient will return to the clinic 2-3 weeks.  2) Crisis Response Plan:  Reviewed emergency resources with the patient and the patient expressed understanding including:  If feeling suicidal, patient will call or present to the [facility] during duty hours or present to closest ED ([facility] or [facility], call 911 or crisis hotline (3-138-278-[facility]) after duty hours.  3) Referrals/Consults:  N/A  4) Barriers to Learning:  No  5) Readiness to Learn:  Yes  6) Cultural Concerns:  No  7) Patient voiced understanding of, and agreement with, plan and goals as annotated above.  8) Declare these services are medically necessary and appropriate to the patient’s diagnosis and needs  9) The point of contact at the [facility] regarding this evaluation is Dr. Booth, Psychologist.    Juarez Booth III, Ed.D.  5/13/2020

## 2020-05-20 ENCOUNTER — TELEMEDICINE (OUTPATIENT)
Dept: BEHAVIORAL HEALTH | Facility: CLINIC | Age: 35
End: 2020-05-20
Payer: COMMERCIAL

## 2020-05-20 VITALS — BODY MASS INDEX: 23.7 KG/M2 | WEIGHT: 160 LBS | HEIGHT: 69 IN

## 2020-05-20 DIAGNOSIS — F90.2 ATTENTION DEFICIT HYPERACTIVITY DISORDER (ADHD), COMBINED TYPE: Primary | ICD-10-CM

## 2020-05-20 DIAGNOSIS — F34.0 CYCLOTHYMIC DISORDER: ICD-10-CM

## 2020-05-20 PROCEDURE — 99213 OFFICE O/P EST LOW 20 MIN: CPT | Mod: 95,CR | Performed by: PSYCHIATRY & NEUROLOGY

## 2020-05-20 PROCEDURE — 90833 PSYTX W PT W E/M 30 MIN: CPT | Mod: 95,CR | Performed by: PSYCHIATRY & NEUROLOGY

## 2020-05-20 RX ORDER — DEXTROAMPHETAMINE SACCHARATE, AMPHETAMINE ASPARTATE MONOHYDRATE, DEXTROAMPHETAMINE SULFATE AND AMPHETAMINE SULFATE 3.75; 3.75; 3.75; 3.75 MG/1; MG/1; MG/1; MG/1
15 CAPSULE, EXTENDED RELEASE ORAL EVERY MORNING
Qty: 20 CAP | Refills: 0 | Status: SHIPPED | OUTPATIENT
Start: 2020-05-20 | End: 2020-06-09

## 2020-05-20 RX ORDER — LITHIUM CARBONATE 450 MG
450 TABLET, EXTENDED RELEASE ORAL
Qty: 30 TAB | Refills: 0 | Status: SHIPPED | OUTPATIENT
Start: 2020-05-20 | End: 2020-06-17 | Stop reason: SDUPTHER

## 2020-05-20 ASSESSMENT — FIBROSIS 4 INDEX: FIB4 SCORE: 0.42

## 2020-05-20 NOTE — PROGRESS NOTES
This encounter was conducted via Zoom .   Verbal consent was obtained. Patient's identity was verified.    PSYCHIATRY FOLLOW-UP NOTE      Name: Maximus Greenwood  MRN: [id number]  : 1985  Age: 34 y.o.  Date of assessment: 2020  PCP: FANY Beltran  Persons in attendance: Patient  Total face-to-face time: 25 minutes    REASON FOR VISIT/CHIEF COMPLAINT (as stated by Patient):  Maximus Greenwood is a 34 y.o., White male, attending follow-up appointment for mood and inattention management      HISTORY OF PRESENT ILLNESS:  Maximus Greenwood is a 34 y.o. old male with mood disorder and ADHD comes in today for follow up. Patient was last seen on 4/15/20 , and following treatment planning recommendations were done:  · Reduce Lithium to 450 mg daily after dinner for mood stabilization for shakes at 600 mg dose. Patient was given 1 month supply with 1 refill.  · Add Adderall XR 10 mg daily for inattention and hyperactivity management    Patient is compliant with medication and tolerating reduction in lithium and reports mood as stable and no longer having shakes that he was getting at 600 mg lithium dosage.  Patient is compliant with Adderall XR 10 mg daily dose and reports taking this on a daily basis for first 2 weeks, but reports daily use made him feel emotionless on occasions.  This resulted in patient taking Adderall 5 days a week on weekdays only.  He noticed marked improvement in hyperactivity but reports persistence of inattention symptoms. On weekend without adderall: he feels struggling with attention aspect but feels okay taking this medication only 5 days a week at this time.  Patient report by mistake he took Adderall XR 20 mg 1 day and that made him more activated and anxious.  Patient did not like the 20 mg dosage.  Educated on importance of taking medication as prescribed and discussed the controlled medication contract he signed during last session.  After discussion patient agreed with plan  of considering increasing Adderall XR dosage to 15 mg and assessing for improvement in attention and hyperactivity symptoms.  Patient denies any new side effects from Adderall including changes in appetite, sleep, psychosis or new onset of obsessive/compulsive behaviors.    RESPONSE TO TREATMENT:  Slow improvement      MEDICATION SIDE EFFECTS:  none      PSYCHOTHERAPY ASPECT OF SESSION (16 MIN):  · Extensive psychoeducation provided on role of each medication in terms of both benefit and side effect profile for both lithium and Adderall.  · Later part of the session was dedicated to letting patient express his feelings regarding recent changes in his life-primarily including their roommate moving out.  He describes good support from wife and describes work as positive support in his life.  · Psychoeducation provided on monitoring for manic or hypomanic symptoms and patient reports understanding.  Patient is more accepting of lithium since the dose was reduced and he is not getting side effects.  · Importance of sleep hygiene was discussed and discussed important triggers that can destabilize mood including decline in sleep for a few nights, substance abuse, medication noncompliance and undergoing multiple stressors.  · Educated and motivated to consider reduction in cannabis use.  · Sleep hygiene education was provided and patient motivated to follow the instructions.      CURRENT MEDICATIONS:  Current Outpatient Medications   Medication Sig Dispense Refill   • lithium CR (ESKALITH CR) 450 MG Tab CR Take 1 Tab by mouth ONE-HALF HOUR AFTER DINNER. 30 Tab 1   • albuterol (VENTOLIN HFA) 108 (90 Base) MCG/ACT Aero Soln inhalation aerosol Inhale 2 Puffs by mouth every 8 hours as needed for Shortness of Breath. 2 Inhaler 1     No current facility-administered medications for this visit.        MEDICAL HISTORY  Past Medical History:   Diagnosis Date   • ADD (attention deficit disorder)      No past surgical history on  "file.    PAST PSYCHIATRIC HISTORY  Prior psychiatric hospitalization: denies  Prior Self harm/suicide attempt: denies  Prior Diagnosis: ADHD     PAST PSYCHIATRIC MEDICATIONS  Ritalin: For stimulant since age 9  Adderall: Ritalin switched to Adderall till age 19 yr: describes as most effective   Focalin: Per patient made him depressed  Strattera: Caused flulike symptoms  Wellbutrin: Not effective for ADHD management  Desipramine     FAMILY HISTORY  Psychiatric diagnosis  none  History of suicide attempts  no  Substance abuse history  none     SUBSTANCE USE HISTORY:  ALCOHOL: occasionally  TOBACCO : denies  CANNABIS 1 bowl: 2-3 days/week  OPIOIDS : denies  PRESCRIPTION MEDICATIONS : denies  OTHERS : denies  History of inpatient/outpatient rehab treatment: none     SOCIAL HISTORY  Education: currently in college ([employer])  in Special Education: no  Intellectual Disability: no  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  Relationship:  for 2 years  Kids: no  Current living situation: lives with wife  Current/past legal issues: denies  History of emotional/physical/sexual abuse - no   History: no      REVIEW OF SYSTEMS:        Constitutional negative   Eyes negative   Ears/Nose/Mouth/Throat negative   Cardiovascular negative   Respiratory negative   Gastrointestinal negative   Genitourinary negative   Muscular negative   Integumentary negative   Neurological negative   Endocrine negative   Hematologic/Lymphatic negative     PHYSICAL EXAMINAION:  Vital signs: Ht 1.753 m (5' 9\") Comment: pt stated  Wt 72.6 kg (160 lb) Comment: pt stated  BMI 23.63 kg/m²   Musculoskeletal: Normal gait.   Abnormal movements: none      MENTAL STATUS EXAMINATION      General:   - Grooming and hygiene: Casual,   - Apparent distress: none,   - Behavior: Calm  - Eye Contact:  Good,   - no psychomotor agitation or retardation    - Participation: Active verbal participation  Orientation: Alert and Fully Oriented to person, " place and time  Mood: Euthymic  Affect: Flexible and Full range,  Thought Process: Logical and Goal-directed  Thought Content: Denies suicidal or homicidal ideations, intent or plan Within normal limits  Perception: Denies auditory or visual hallucinations. No delusions noted Within normal limits  Attention span and concentration: Intact   Speech:Rapid and but redirectable  Language: Appropriate   Insight: Good  Judgment: Good  Recent and remote memory: No gross evidence of memory deficits        DEPRESSION SCREENING:  Depression Screen (PHQ-2/PHQ-9) 2/24/2020 3/20/2020   PHQ-2 Total Score 3 1   PHQ-9 Total Score 10 5       Interpretation of PHQ-9 Total Score   Score Severity   1-4 No Depression   5-9 Mild Depression   10-14 Moderate Depression   15-19 Moderately Severe Depression   20-27 Severe Depression    CURRENT RISK:       Suicidal: Low       Homicidal: Low       Self-Harm: Low       Relapse: Low       Crisis Safety Plan Reviewed Not Indicated      MEDICAL RECORDS/LABS/DIAGNOSTIC TESTS REVIEWED:  Component      Latest Ref Rng & Units 4/10/2020          12:14 PM   Lithium      0.6 - 1.2 mmol/L 0.6       [place]  records -   Reviewed   Fill Date ID   Written Drug Qty Days Prescriber Rx # Pharmacy Refill   Daily Dose* Pymt Type      04/17/2020  1   04/17/2020  Dextroamp-Amphet Er 10 MG Cap  30.00 30 [address] [facility]   [id number]   [id number]   0   [facility]   [place]       DIAGNOSTIC IMPRESSION(S):  · Mood disorder rule out bipolar 2 disorder versus cyclothymia  · Rule out comorbid ADHD  · Cannabis abuse        ASSESSMENT & PLAN:  (1) Mood disorder rule out Bipolar 2 disorder versus cyclothymia  · stable  · Continue Lithium 450 mg daily after dinner for mood stabilization. Patient was given 1 month supply with 0 refill.  · Medication options, alternatives (including no medications) and medication risks/benefits/side effects were discussed in detail.  · The patient was advised to call, message provider on MyChart, or come in to the  clinic if symptoms worsen or if any future questions/issues regarding their medications arise; the patient verbalized understanding and agreement.  · The patient was educated to call 911, call the suicide hotline, or go to local ER if having thoughts of suicide or homicide; verbalized understanding.     (2) ADHD:  · Slow improvement: persistence of inattention symptoms, mild improvement in hyperactivity   · Increase Adderall XR 15 mg daily for inattention and hyperactivity management.  Given 1 month supply with no refill.     (3) Cannabis abuse:  · Patient motivated to consider reduction and discontinuation of cannabis use.      Return to clinic in 4 weeks or sooner if symptoms worsen.  Next Appointment: June 17 at 11:30 am    The proposed treatment plan was discussed with the patient who was provided the opportunity to ask questions and make suggestions regarding alternative treatment. Patient verbalized understanding and expressed agreement with the plan.       Armando Kline M.D.  05/20/20    This note was created using voice recognition software (Dragon). The accuracy of the dictation is limited by the abilities of the software. I have reviewed the note prior to signing, however some errors in grammar and context are still possible. If you have any questions related to this note please do not hesitate to contact our office.

## 2020-06-04 NOTE — BH THERAPY
[facility]  Psychological Consultation    Name: Maximus Greenwood  MRN: [id number]  : 1985  Age: 34 y.o.  Date of assessment: 3/16/2020  PCP: FANY Beltran  Persons in attendance: Patient  Total session time: 100 minutes + 60 for report writting      CHIEF COMPLAINT AND HISTORY OF PRESENTING PROBLEM:  (as stated by Patient):  Maximus Greenwood is a 34 y.o., White male referred for assessment by Armando Kline M.D..  Primary presenting issue includes   Chief Complaint   Patient presents with   • ADHD   • Depressed   The patient ID/Chief Complaint:  The patient is a 34 year old male, , .  The patient was referred by psychiatry and voluntarily presented for psychological testing specifically related to differential diagnosis of ADHD or other psychopathology.  Reviewed limits of confidentiality and [facility] policies; patient expressed understanding and agreed to voluntarily proceed with evaluation and treatment.    Social history: The patient was born in [place] raised in [place] he denies a history of abuse and neglect the patient has been  for 3 months had been living together for approximately 2 years he has no children.    Educational history: The patient reported that he graduated from high school with a 2.0 GPA he denies a history of special education or suspensions he is currently enrolled at the community college and has approximately 60 credit hours.  The patient does report during his educational history having difficulties with attention and he was prescribed psychotropic medications for ADHD between the ages of 9 and 19.    Legal history: Patient reported that he was arrested for an unpaid speeding ticket.    Psychiatric history: The patient denies a history of psychiatric hospitalization suicide attempts, and self-injurious behavior.  The patient does report that he was under the care of a psychiatrist between the ages of 9 and  19 for the treatment of ADHD he indicated that a number of psychotropic medications were used and that he found that Adderall was the most effective.  The patient denies a history of psychotherapy.    Family psychiatric history: The patient reports that his father has undiagnosed ADHD but he describes a number of behaviors associated with ADHD    Substance abuse history: The patient reports that he is currently using cannabis on a regular basis for self regulation and to improve sleep.  The patient denies use of alcohol or other drugs.      Medical history: The patient reports that he is in good health he denies a history of traumatic brain injury loss of consciousness, and seizure he also denies other neurological concern.    Past medical/surgical history:   Past Medical History:   Diagnosis Date   • ADD (attention deficit disorder)       History reviewed. No pertinent surgical history.     Medication Allergies:  Patient has no known allergies.      SAFETY ASSESSMENT - SELF  The patient denied any suicide-related ideation and/or behaviors and intent/plan, denied thoughts about death and dying both in session and during the period since last appointment, or past 2 weeks.    Current Suicide Risk: Low    Risk Level: Not Currently at Clinically Significant Risk  Hospitalization is not deemed necessary at this time as the patient does not present a clear or imminent danger to self or others. No indication for pursuing higher level of care. Outpatient management is currently most appropriate and least restrictive level of care.      MENTAL STATUS/OBSERVATIONS     Patient did not present in acute distress. Patient was appropriately groomed. Patient was alert and oriented x4. Eye contact was appropriate.  Mild abnormalities in attention or concentration were noted. No abnormalities of movement present; psychomotor activity was normal. Speech was fluent and regular in rhythm, rate, volume, and tone. Thought processes  Linear, Logical and Goal Directed. There was no evidence of thought disorder. No auditory or visual hallucinations. Long and short term memory appeared to be intact. Insight, judgment, and impulse control were deemed to be good.  Reported mood was “happy.” Affect was full-ranging and appropriate to thought content and conversation.  Patient denied past and current suicidal and homicidal ideation in plan, intent, and preparatory behavior.      RESULTS OF PSYCHOLOGICAL MEASURES:    The patient was administered a number of measures associated with attention overall assessment suggests above average performance across a wide range of domains including memory language and visual-spatial ability.  The patient's executive functioning also appears to be within the average range.  There was some indications of difficulties associated with fatigue and memory performance he was initially administered a coding in which he scored in the average range but after completing 60 minutes of testing that score dropped to the low average range suggesting that long periods of sustained attention decrease his performance which is consistent with ADHD.    Measurements of psychopathology suggest some concerns related to hypomania however the level of hypomania appears low intensity and it was difficult to differentiate between the low intensity hypomania versus ADHD impulsivity.      DIAGNOSTIC IMPRESSION(S):  1. Attention deficit hyperactivity disorder (ADHD), combined type    2. Cyclothymia      PLAN:    IDENTIFIED NEEDS/PLAN:  [If any of these marked, trigger DISPOSITION list]  Mood/anxiety and Other: Psychological Testing  Refer to [facility]: Outpatient Therapy      1) The patient will will be seen as needed.  2) Referrals/Consults:  N/A  3) Referral appointment(s) scheduled? No  4) Barriers to Learning:  No   5) Readiness to Learn:  Yes   6) Cultural Concerns:  No   7) Patient voiced understanding of, and agreement with,  plan and goals as annotated above Yes .  8) Declare these services are medically necessary and appropriate to the patient’s diagnosis and needs  9) The point of contact at the [facility] regarding this evaluation is Dr. Booth, Psychologist.    Juarez Booth III, [place].    This note was created using voice recognition software (Dragon). The accuracy of the dictation is limited by the abilities of the software. I have reviewed the note prior to signing, however some errors in grammar and context are still possible. If you have any questions related to this note please do not hesitate to contact our office.

## 2020-06-17 ENCOUNTER — TELEMEDICINE (OUTPATIENT)
Dept: BEHAVIORAL HEALTH | Facility: CLINIC | Age: 35
End: 2020-06-17
Payer: COMMERCIAL

## 2020-06-17 VITALS — BODY MASS INDEX: 23.7 KG/M2 | HEIGHT: 69 IN | WEIGHT: 160 LBS

## 2020-06-17 DIAGNOSIS — F90.2 ATTENTION DEFICIT HYPERACTIVITY DISORDER (ADHD), COMBINED TYPE: ICD-10-CM

## 2020-06-17 DIAGNOSIS — F34.0 CYCLOTHYMIC DISORDER: ICD-10-CM

## 2020-06-17 PROCEDURE — 99213 OFFICE O/P EST LOW 20 MIN: CPT | Mod: 95,CR | Performed by: PSYCHIATRY & NEUROLOGY

## 2020-06-17 RX ORDER — LITHIUM CARBONATE 450 MG
450 TABLET, EXTENDED RELEASE ORAL
Qty: 30 TAB | Refills: 0 | Status: SHIPPED | OUTPATIENT
Start: 2020-06-17 | End: 2020-09-02 | Stop reason: SDUPTHER

## 2020-06-17 ASSESSMENT — FIBROSIS 4 INDEX: FIB4 SCORE: 0.42

## 2020-06-17 NOTE — PROGRESS NOTES
This encounter was conducted via Zoom .   Verbal consent was obtained. Patient's identity was verified.      PSYCHIATRY FOLLOW-UP NOTE      Name: Maximus Greenwood  MRN: [id number]  : 1985  Age: 34 y.o.  Date of assessment: 2020  PCP: FANY Beltran  Persons in attendance: Patient  Total face-to-face time: 15 minutes      REASON FOR VISIT/CHIEF COMPLAINT (as stated by Patient):  Maximus Greenwood is a 34 y.o., White male, attending follow-up appointment for mood and ADHD management.      HISTORY OF PRESENT ILLNESS:  Maximus Greenwood is a 34 y.o. old male with ADHD & cyclothymia comes in today for follow up. Patient was last seen 1 month ago , and following treatment planning recommendations were done:  · Increase Adderall XR 15 mg daily for inattention and hyperactivity management.  Given 1 month supply with no refill.  · Continue Lithium 450 mg daily after dinner for mood stabilization. Patient was given 1 month supply with 0 refill.  · Patient motivated to consider reduction and discontinuation of cannabis use.    Patient is only compliant with lithium but was not able to pick the Adderall XR 15 mg prescription: As he lost his ID and DMV was close.  He recently applied for a new ID and waiting to get his ID and then he will pick the prescription from pharmacy.  Patient took 1 month off from studies and reports not needing much Adderall and was able to function with current inattention symptoms.  Patient describes mood as stable and is taking lithium as prescribed.  Patient denies any symptoms consistent with depression, daya, hypomania, psychosis, suicidal or homicidal ideations.    Patient agreed with plan of beginning Adderall XR 15 mg as soon as he picks this from pharmacy and assessing if he feels improvement with no side effects including hypomanic or manic switches.    I also called the [facility] pharmacy at [phone] and they confirmed that patient has not picked the prescription  yet.    MEDICATION SIDE EFFECTS:  none      CURRENT MEDICATIONS:  Current Outpatient Medications   Medication Sig Dispense Refill   • lithium CR (ESKALITH CR) 450 MG Tab CR Take 1 Tab by mouth ONE-HALF HOUR AFTER DINNER. 30 Tab 0   • albuterol (VENTOLIN HFA) 108 (90 Base) MCG/ACT Aero Soln inhalation aerosol Inhale 2 Puffs by mouth every 8 hours as needed for Shortness of Breath. 2 Inhaler 1     No current facility-administered medications for this visit.        MEDICAL HISTORY  Past Medical History:   Diagnosis Date   • ADD (attention deficit disorder)      No past surgical history on file.    PAST PSYCHIATRIC HISTORY  Prior psychiatric hospitalization: denies  Prior Self harm/suicide attempt: denies  Prior Diagnosis: ADHD     PAST PSYCHIATRIC MEDICATIONS  Ritalin: For stimulant since age 9  Adderall: Ritalin switched to Adderall till age 19 yr: describes as most effective   Focalin: Per patient made him depressed  Strattera: Caused flulike symptoms  Wellbutrin: Not effective for ADHD management  Desipramine     FAMILY HISTORY  Psychiatric diagnosis  none  History of suicide attempts  no  Substance abuse history  none     SUBSTANCE USE HISTORY:  ALCOHOL: occasionally  TOBACCO : denies  CANNABIS 1 bowl: 2-3 days/week  OPIOIDS : denies  PRESCRIPTION MEDICATIONS : denies  OTHERS : denies  History of inpatient/outpatient rehab treatment: none     SOCIAL HISTORY  Education: currently in college ([employer])  in Special Education: no  Intellectual Disability: no  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  Relationship:  for 2 years  Kids: no  Current living situation: lives with wife  Current/past legal issues: denies  History of emotional/physical/sexual abuse - no   History: no      REVIEW OF SYSTEMS:        Constitutional negative   Eyes negative   Ears/Nose/Mouth/Throat negative   Cardiovascular negative   Respiratory negative   Gastrointestinal negative   Genitourinary negative   Muscular  "negative   Integumentary negative   Neurological negative   Endocrine negative   Hematologic/Lymphatic negative     PHYSICAL EXAMINAION:  Vital signs: Ht 1.753 m (5' 9\") Comment: pt stated  Wt 72.6 kg (160 lb) Comment: pt stated  BMI 23.63 kg/m²   Musculoskeletal: Normal gait.   Abnormal movements: none      MENTAL STATUS EXAMINATION      General:   - Grooming and hygiene: Casual,   - Apparent distress: none,   - Behavior: Calm  - Eye Contact:  Good,   - no psychomotor agitation or retardation    - Participation: Active verbal participation  Orientation: Alert and Fully Oriented to person, place and time  Mood: Euthymic  Affect: Flexible and Full range,  Thought Process: Logical and Goal-directed  Thought Content: Denies suicidal or homicidal ideations, intent or plan Within normal limits  Perception: Denies auditory or visual hallucinations. No delusions noted Within normal limits  Attention span and concentration: fair  Speech:Rate within normal limits  Language: Appropriate   Insight: Adequate  Judgment: Adequate  Recent and remote memory: No gross evidence of memory deficits        DEPRESSION SCREENING:  Depression Screen (PHQ-2/PHQ-9) 2/24/2020 3/20/2020   PHQ-2 Total Score 3 1   PHQ-9 Total Score 10 5       Interpretation of PHQ-9 Total Score   Score Severity   1-4 No Depression   5-9 Mild Depression   10-14 Moderate Depression   15-19 Moderately Severe Depression   20-27 Severe Depression    CURRENT RISK:       Suicidal: Low       Homicidal: Low       Self-Harm: Low       Relapse: Low       Crisis Safety Plan Reviewed Not Indicated       If evidence of imminent risk is present, intervention/plan:      MEDICAL RECORDS/LABS/DIAGNOSTIC TESTS REVIEWED:  No new lab since last visit     [place]  records -   Fill Date ID   Written Drug Qty Days Prescriber Rx # Pharmacy Refill   Daily Dose* Pymt Type      04/17/2020  1   04/17/2020  Dextroamp-Amphet Er 10 MG Cap  30.00 30 [address] [facility]   [id number]   [facility] ([id number])   0   Comm " Ins   [place]       DIAGNOSTIC IMPRESSION(S):  · Mood disorder rule out bipolar 2 disorder versus cyclothymia  · Rule out comorbid ADHD  · Cannabis abuse        ASSESSMENT & PLAN:  (1) Mood disorder rule out Bipolar 2 disorder versus cyclothymia  · stable  · Continue Lithium 450 mg daily after dinner for mood stabilization. Patient was given 1 month supply with 0 refill.  · Medication options, alternatives (including no medications) and medication risks/benefits/side effects were discussed in detail.  · The patient was advised to call, message provider on [facility], or come in to the clinic if symptoms worsen or if any future questions/issues regarding their medications arise; the patient verbalized understanding and agreement.  · The patient was educated to call 911, call the suicide hotline, or go to local ER if having thoughts of suicide or homicide; verbalized understanding.     (2) ADHD:  · Patient not able to pick adderall XR 15 mg prescription: as he los his ID. He has applied for a new ID and will pick the prescription accordingly.    · Begin Adderall XR 15 mg daily for inattention and hyperactivity management and assess if he can tolerate this dose with no side effects.  Given 1 month supply with no refill.     (3) Cannabis abuse:  · Patient motivated to consider reduction and discontinuation of cannabis use.      Return to clinic in 4 weeks or sooner if symptoms worsen.  Next Appointment: instruction provided on how to make the next appointment.     The proposed treatment plan was discussed with the patient who was provided the opportunity to ask questions and make suggestions regarding alternative treatment. Patient verbalized understanding and expressed agreement with the plan.       Armando Kline M.D.  06/17/20    This note was created using voice recognition software (Dragon). The accuracy of the dictation is limited by the abilities of the software. I have reviewed the note prior to signing, however some  errors in grammar and context are still possible. If you have any questions related to this note please do not hesitate to contact our office.

## 2020-09-02 ENCOUNTER — TELEMEDICINE (OUTPATIENT)
Dept: BEHAVIORAL HEALTH | Facility: CLINIC | Age: 35
End: 2020-09-02
Payer: COMMERCIAL

## 2020-09-02 VITALS — BODY MASS INDEX: 23.7 KG/M2 | HEIGHT: 69 IN | WEIGHT: 160 LBS

## 2020-09-02 DIAGNOSIS — F34.0 CYCLOTHYMIC DISORDER: ICD-10-CM

## 2020-09-02 DIAGNOSIS — F90.2 ATTENTION DEFICIT HYPERACTIVITY DISORDER (ADHD), COMBINED TYPE: ICD-10-CM

## 2020-09-02 PROCEDURE — 99213 OFFICE O/P EST LOW 20 MIN: CPT | Mod: 95,CR | Performed by: PSYCHIATRY & NEUROLOGY

## 2020-09-02 RX ORDER — DEXTROAMPHETAMINE SACCHARATE, AMPHETAMINE ASPARTATE MONOHYDRATE, DEXTROAMPHETAMINE SULFATE AND AMPHETAMINE SULFATE 3.75; 3.75; 3.75; 3.75 MG/1; MG/1; MG/1; MG/1
15 CAPSULE, EXTENDED RELEASE ORAL EVERY MORNING
Qty: 30 CAP | Refills: 0 | Status: SHIPPED | OUTPATIENT
Start: 2020-09-02 | End: 2020-10-02

## 2020-09-02 RX ORDER — DEXTROAMPHETAMINE SACCHARATE, AMPHETAMINE ASPARTATE MONOHYDRATE, DEXTROAMPHETAMINE SULFATE AND AMPHETAMINE SULFATE 3.75; 3.75; 3.75; 3.75 MG/1; MG/1; MG/1; MG/1
15 CAPSULE, EXTENDED RELEASE ORAL EVERY MORNING
Qty: 30 CAP | Refills: 0 | Status: SHIPPED | OUTPATIENT
Start: 2020-10-03 | End: 2020-11-02

## 2020-09-02 RX ORDER — LITHIUM CARBONATE 450 MG
450 TABLET, EXTENDED RELEASE ORAL
Qty: 30 TAB | Refills: 2 | Status: SHIPPED | OUTPATIENT
Start: 2020-09-02 | End: 2021-02-20

## 2020-09-02 RX ORDER — DEXTROAMPHETAMINE SACCHARATE, AMPHETAMINE ASPARTATE MONOHYDRATE, DEXTROAMPHETAMINE SULFATE AND AMPHETAMINE SULFATE 3.75; 3.75; 3.75; 3.75 MG/1; MG/1; MG/1; MG/1
15 CAPSULE, EXTENDED RELEASE ORAL EVERY MORNING
Qty: 30 CAP | Refills: 0 | Status: SHIPPED | OUTPATIENT
Start: 2020-11-03 | End: 2021-08-03 | Stop reason: SDUPTHER

## 2020-09-02 ASSESSMENT — FIBROSIS 4 INDEX: FIB4 SCORE: 0.43

## 2020-09-02 NOTE — PROGRESS NOTES
This evaluation was conducted via Zoom using secure and encrypted videoconferencing technology. The patient was in a private location in the [facility].    The patient's identity was confirmed and verbal consent was obtained for this virtual visit.    PSYCHIATRY FOLLOW-UP NOTE      Name: Maximus Greenwood  MRN: [id number]  : 1985  Age: 35 y.o.  Date of assessment: 2020  PCP: FNAY Beltran  Persons in attendance: Patient  Total face-to-face time: 15 minutes    REASON FOR VISIT/CHIEF COMPLAINT (as stated by Patient):  Maximus Greenwood is a 35 y.o., White male, attending follow-up appointment for mood and anxiety management.      HISTORY OF PRESENT ILLNESS:  Maximus Greenwood is a 35 y.o. old male with ADHD and mood disorder comes in today for follow up. Patient was last seen 2 months ago, and following treatment planning recommendations were done:  · Continue Lithium 450 mg daily after dinner for mood stabilization. Patient was given 1 month supply with 0 refill.  · Begin Adderall XR 15 mg daily for inattention and hyperactivity management and assess if he can tolerate this dose with no side effects.  Given 1 month supply with no refill.      Patient was not compliant with Adderall and lithium for last 2 months because his school semester finished.  Patient began his next semester of school last week and interested in restarting both lithium and Adderall.  Patient understand the importance of taking these medications on a daily basis.  I reviewed PDMP and no concerning behaviors noted.  Patient agreed with plan of restarting both medication at the dose he was stable on.  Patient denies any symptoms consistent with depression, hypomania, daya or psychosis.  Patient remained appropriate during entire evaluation and understand the importance of monitoring for stimulant side effect primarily including changes in mood, sleep, appetite, tachycardia, psychosis, new onset of obsessive/compulsive  behavior or any new physical symptoms.      CURRENT MEDICATIONS:  Current Outpatient Medications   Medication Sig Dispense Refill   • lithium CR (ESKALITH CR) 450 MG Tab CR Take 1 Tab by mouth ONE-HALF HOUR AFTER DINNER. 30 Tab 0   • albuterol (VENTOLIN HFA) 108 (90 Base) MCG/ACT Aero Soln inhalation aerosol Inhale 2 Puffs by mouth every 8 hours as needed for Shortness of Breath. 2 Inhaler 1     No current facility-administered medications for this visit.        MEDICAL HISTORY  Past Medical History:   Diagnosis Date   • ADD (attention deficit disorder)      No past surgical history on file.    PAST PSYCHIATRIC HISTORY  Prior psychiatric hospitalization: denies  Prior Self harm/suicide attempt: denies  Prior Diagnosis: ADHD     PAST PSYCHIATRIC MEDICATIONS  Ritalin: For stimulant since age 9  Adderall: Ritalin switched to Adderall till age 19 yr: describes as most effective   Focalin: Per patient made him depressed  Strattera: Caused flulike symptoms  Wellbutrin: Not effective for ADHD management  Desipramine     FAMILY HISTORY  Psychiatric diagnosis  none  History of suicide attempts  no  Substance abuse history  none     SUBSTANCE USE HISTORY:  ALCOHOL: occasionally  TOBACCO : denies  CANNABIS 1 bowl: 2-3 days/week  OPIOIDS : denies  PRESCRIPTION MEDICATIONS : denies  OTHERS : denies  History of inpatient/outpatient rehab treatment: none     SOCIAL HISTORY  Education: currently in college ([employer])  in Special Education: no  Intellectual Disability: no  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  Relationship:  for 2 years  Kids: no  Current living situation: lives with wife  Current/past legal issues: denies  History of emotional/physical/sexual abuse - no   History: no      REVIEW OF SYSTEMS:        Constitutional negative   Eyes negative   Ears/Nose/Mouth/Throat negative   Cardiovascular negative   Respiratory negative   Gastrointestinal negative   Genitourinary negative   Muscular  "negative   Integumentary negative   Neurological negative   Endocrine negative   Hematologic/Lymphatic negative     PHYSICAL EXAMINAION:  Vital signs: Ht 1.753 m (5' 9\")   Wt 72.6 kg (160 lb)   BMI 23.63 kg/m²   Musculoskeletal: Normal gait.   Abnormal movements: None      MENTAL STATUS EXAMINATION      General:   - Grooming and hygiene: Good,   - Apparent distress: none,   - Behavior: Calm  - Eye Contact:  Good,   - no psychomotor agitation or retardation    - Participation: Active verbal participation  Orientation: Alert and Fully Oriented to person, place and time  Mood: Euthymic  Affect: Flexible and Full range,  Thought Process: Logical and Goal-directed  Thought Content: Denies suicidal or homicidal ideations, intent or plan Within normal limits  Perception: Denies auditory or visual hallucinations. No delusions noted Within normal limits  Attention span and concentration: Intact   Speech:Rate within normal limits and Volume within normal limits  Language: Appropriate   Insight: Good  Judgment: Good  Recent and remote memory: No gross evidence of memory deficits        DEPRESSION SCREENING:  Depression Screen (PHQ-2/PHQ-9) 2/24/2020 3/20/2020   PHQ-2 Total Score 3 1   PHQ-9 Total Score 10 5       Interpretation of PHQ-9 Total Score   Score Severity   1-4 No Depression   5-9 Mild Depression   10-14 Moderate Depression   15-19 Moderately Severe Depression   20-27 Severe Depression    CURRENT RISK:       Suicidal: Low       Homicidal: Low       Self-Harm: Low       Relapse: Low       Crisis Safety Plan Reviewed Not Indicated       If evidence of imminent risk is present, intervention/plan:      MEDICAL RECORDS/LABS/DIAGNOSTIC TESTS REVIEWED:  No new lab since last visit     [place]  records -   Reviewed       DIAGNOSTIC IMPRESSION(S):  · Mood disorder rule out bipolar 2 disorder versus cyclothymia  · Rule out comorbid ADHD  · Cannabis abuse        ASSESSMENT & PLAN:  (1) Mood disorder rule out Bipolar 2 disorder " versus cyclothymia  · Restart Lithium 450 mg daily after dinner for mood stabilization. Patient was given 1 month supply with 2 refill.  · Medication options, alternatives (including no medications) and medication risks/benefits/side effects were discussed in detail.  · The patient was advised to call, message provider on MyChart, or come in to the clinic if symptoms worsen or if any future questions/issues regarding their medications arise; the patient verbalized understanding and agreement.  · The patient was educated to call 911, call the suicide hotline, or go to local ER if having thoughts of suicide or homicide; verbalized understanding.     (2) ADHD:  · Restart Adderall XR 15 mg daily for inattention and hyperactivity management and assess if he can tolerate this dose with no side effects.  Given following 3 prescription for 1 month supply each with following dates: 9/2-10/2/20; 10/3-11/2/20; 11/3-12/3/20.     (3) Cannabis abuse:  · Patient motivated to consider reduction and discontinuation of cannabis use.       Return to clinic in 3 months or sooner if symptoms worsen.  Next Appointment: instruction provided on how to make the next appointment.     The proposed treatment plan was discussed with the patient who was provided the opportunity to ask questions and make suggestions regarding alternative treatment. Patient verbalized understanding and expressed agreement with the plan.       Armando Kline M.D.  09/02/20    This note was created using voice recognition software (Dragon). The accuracy of the dictation is limited by the abilities of the software. I have reviewed the note prior to signing, however some errors in grammar and context are still possible. If you have any questions related to this note please do not hesitate to contact our office.

## 2020-11-09 ENCOUNTER — OCCUPATIONAL MEDICINE (OUTPATIENT)
Dept: URGENT CARE | Facility: PHYSICIAN GROUP | Age: 35
End: 2020-11-09
Payer: COMMERCIAL

## 2020-11-09 VITALS
HEIGHT: 68 IN | OXYGEN SATURATION: 97 % | WEIGHT: 150 LBS | SYSTOLIC BLOOD PRESSURE: 110 MMHG | DIASTOLIC BLOOD PRESSURE: 72 MMHG | TEMPERATURE: 98.2 F | BODY MASS INDEX: 22.73 KG/M2 | RESPIRATION RATE: 18 BRPM | HEART RATE: 87 BPM

## 2020-11-09 DIAGNOSIS — S39.012A STRAIN OF LUMBAR PARASPINOUS MUSCLE, INITIAL ENCOUNTER: ICD-10-CM

## 2020-11-09 PROCEDURE — 99204 OFFICE O/P NEW MOD 45 MIN: CPT | Performed by: NURSE PRACTITIONER

## 2020-11-09 RX ORDER — CYCLOBENZAPRINE HCL 5 MG
5-10 TABLET ORAL 3 TIMES DAILY PRN
Qty: 15 TAB | Refills: 0 | Status: SHIPPED | OUTPATIENT
Start: 2020-11-09 | End: 2020-11-14 | Stop reason: SDUPTHER

## 2020-11-09 RX ORDER — METHYLPREDNISOLONE 4 MG/1
TABLET ORAL
Qty: 21 TAB | Refills: 0 | Status: SHIPPED | OUTPATIENT
Start: 2020-11-09 | End: 2021-02-20

## 2020-11-09 ASSESSMENT — ENCOUNTER SYMPTOMS: BACK PAIN: 1

## 2020-11-09 ASSESSMENT — FIBROSIS 4 INDEX: FIB4 SCORE: 0.43

## 2020-11-09 NOTE — PATIENT INSTRUCTIONS
Lumbosacral Strain  Lumbosacral strain is an injury that causes pain in the lower back (lumbosacral spine). This injury usually occurs from overstretching the muscles or ligaments along your spine. A strain can affect one or more muscles or cord-like tissues that connect bones to other bones (ligaments).  What are the causes?  This condition may be caused by:  · A hard, direct hit (blow) to the back.  · Excessive stretching of the lower back muscles. This may result from:  ? A fall.  ? Lifting something heavy.  ? Repetitive movements such as bending or crouching.  What increases the risk?  The following factors may increase your risk of getting this condition:  · Participating in sports or activities that involve:  ? A sudden twist of the back.  ? Pushing or pulling motions.  · Being overweight or obese.  · Having poor strength and flexibility, especially tight hamstrings or weak muscles in the back or abdomen.  · Having too much of a curve in the lower back.  · Having a pelvis that is tilted forward.  What are the signs or symptoms?  The main symptom of this condition is pain in the lower back, at the site of the strain. Pain may extend (radiate) down one or both legs.  How is this diagnosed?  This condition is diagnosed based on:  · Your symptoms.  · Your medical history.  · A physical exam.  ? Your health care provider may push on certain areas of your back to determine the source of your pain.  ? You may be asked to bend forward, backward, and side to side to assess the severity of your pain and your range of motion.  · Imaging tests, such as:  ? X-rays.  ? MRI.    How is this treated?  Treatment for this condition may include:  · Putting heat and cold on the affected area.  · Medicines to help relieve pain and relax your muscles (muscle relaxants).  · NSAIDs to help reduce swelling and discomfort.  When your symptoms improve, it is important to gradually return to your normal routine as soon as possible to  reduce pain, avoid stiffness, and avoid loss of muscle strength. Generally, symptoms should improve within 6 weeks of treatment. However, recovery time varies.  Follow these instructions at home:  Managing pain, stiffness, and swelling    · If directed, put ice on the injured area during the first 24 hours after your strain.  ? Put ice in a plastic bag.  ? Place a towel between your skin and the bag.  ? Leave the ice on for 20 minutes, 2-3 times a day.  · If directed, put heat on the affected area as often as told by your health care provider. Use the heat source that your health care provider recommends, such as a moist heat pack or a heating pad.  ? Place a towel between your skin and the heat source.  ? Leave the heat on for 20-30 minutes.  ? Remove the heat if your skin turns bright red. This is especially important if you are unable to feel pain, heat, or cold. You may have a greater risk of getting burned.  Activity  · Rest and return to your normal activities as told by your health care provider. Ask your health care provider what activities are safe for you.  · Avoid activities that take a lot of energy for as long as told by your health care provider.  General instructions  · Take over-the-counter and prescription medicines only as told by your health care provider.  · Do not drive or use heavy machinery while taking prescription pain medicine.  · Do not use any products that contain nicotine or tobacco, such as cigarettes and e-cigarettes. If you need help quitting, ask your health care provider.  · Keep all follow-up visits as told by your health care provider. This is important.  How is this prevented?  · Use correct form when playing sports and lifting heavy objects.  · Use good posture when sitting and standing.  · Maintain a healthy weight.  · Sleep on a mattress with medium firmness to support your back.  · Be safe and responsible while being active to avoid falls.  · Do at least 150 minutes of  moderate-intensity exercise each week, such as brisk walking or water aerobics. Try a form of exercise that takes stress off your back, such as swimming or stationary cycling.  · Maintain physical fitness, including:  ? Strength.  ? Flexibility.  ? Cardiovascular fitness.  ? Endurance.  Contact a health care provider if:  · Your back pain does not improve after 6 weeks of treatment.  · Your symptoms get worse.  Get help right away if:  · Your back pain is severe.  · You cannot stand or walk.  · You have difficulty controlling when you urinate or when you have a bowel movement.  · You feel nauseous or you vomit.  · Your feet get very cold.  · You have numbness, tingling, weakness, or problems using your arms or legs.  · You develop any of the following:  ? Shortness of breath.  ? Dizziness.  ? Pain in your legs.  ? Weakness in your buttocks or legs.  ? Discoloration of the skin on your toes or legs.  This information is not intended to replace advice given to you by your health care provider. Make sure you discuss any questions you have with your health care provider.  Document Released: 09/27/2006 Document Revised: 04/10/2020 Document Reviewed: 05/21/2017  Elsevier Patient Education © 2020 Elsevier Inc.

## 2020-11-09 NOTE — LETTER
"   [facility]  [address] [place] [address]. [address] - [place] [address]  Phone:  [phone] - Fax:  [phone]   [facility] Progress Report and Disability Certification  Date of Service: 11/9/2020  No Show:  No  Date / Time of Next Visit: 11/12/2020   Claim Information   Patient Name: Maximus Greenwood  Claim Number:     Employer:   [employer]  Date of Injury: 11/8/2020     Insurer / TPA: [facility]  ID / SSN:     Occupation: [employer]  Diagnosis: The encounter diagnosis was Strain of lumbar paraspinous muscle, initial encounter.    Medical Information   Related to Industrial Injury? Yes    Subjective Complaints:  DOI: 11/8/2020. Patient reports walking downhill in snow, while carrying a joseph. His foot slipped forward, and he \"caught the joseph\". Felt pain across lower back. Did not fall. Pain 5/10. Dull, aching, throbbing pain. Bending over aggravates pain. Had difficulty sleeping due to pain. No numbness or weakness.  No loss of bowel or bladder. Denies previous back injury or pain. Denies second job.    Objective Findings: Musculoskeletal:      Lumbar back: He exhibits tenderness. He exhibits no bony tenderness, no swelling, no edema and no deformity.      Comments: Antalgic gait. No midline lumbar tenderness to palpation. Bilateral paraspinal tenderness to palpation. Strong and equal bilateral extremity strength.     Neurological:      Mental Status: He is oriented to person, place, and time.      Deep Tendon Reflexes:      Reflex Scores:       Patellar reflexes are 2+ on the right side and 2+ on the left side.       Pre-Existing Condition(s): None known.    Assessment:   Initial Visit    Status: Additional Care Required  Permanent Disability:No    Plan: MedicationMedication (NOT at Work)    Diagnostics:      Comments:       Disability Information   Status: Released to Restricted Duty    From:  11/9/2020  Through: 11/12/2020 Restrictions are: " Temporary   Physical Restrictions   Sitting:    Standing:    Stoopin hrs/day Bendin hrs/day   Squattin hrs/day Walking:    Climbin hrs/day Pushing:      Pulling:    Other:    Reaching Above Shoulder (L):   Reaching Above Shoulder (R):       Reaching Below Shoulder (L):    Reaching Below Shoulder (R):      Not to exceed Weight Limits   Carrying(hrs):   Weight Limit(lb): < or = to 10 pounds Lifting(hrs):   Weight  Limit(lb): < or = to 10 pounds   Comments: - methylPREDNISolone (MEDROL DOSEPAK) 4 MG Tablet Therapy Pack; Follow schedule on package instructions.  Dispense: 21 Tab; Refill: 0  - cyclobenzaprine (FLEXERIL) 5 mg tablet; Take 1-2 Tabs by mouth 3 times a day as needed for Moderate Pain or Muscle Spasms.  Dispense: 15 Tab; Refill: 0  -Discussed sedative effects of muscle relaxers. Not to be use at work or while driving.   -Can take OTC Tylenol as directed for pain.   -Temperature Therapy: Heat or ice, whichever feels better.  -Gentle ROM back stretches and exercises.   -Follow up in 3 days    Repetitive Actions   Hands: i.e. Fine Manipulations from Grasping:     Feet: i.e. Operating Foot Controls:     Driving / Operate Machinery:     Provider Name:   JAZMÍN Squires Physician Signature:  Physician Name:     Clinic Name / Location: [address]  [place] [address] Clinic Phone Number: Dept: [phone]   Appointment Time: 12:15 Pm Visit Start Time: 1:00 PM   Check-In Time:  12:28 Pm Visit Discharge Time:  2:27PM   Original-Treating Physician or Chiropractor    Page 2-Insurer/TPA    Page 3-Employer    Page 4-Employee

## 2020-11-09 NOTE — LETTER
"EMPLOYEE’S CLAIM FOR COMPENSATION/ REPORT OF INITIAL TREATMENT  FORM C-4    EMPLOYEE’S CLAIM - PROVIDE ALL INFORMATION REQUESTED   First Name  Maximus Last Name  Krystyna Birthdate                    1985                Sex  male Claim Number   Home Address  [address]  Age  35 y.o. 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 (if applicable)  going back to vehicle    How did this injury or occupational disease occur? (Be specific an answer in detail. Use additional sheet if necessary)  I was carrying my 3-ton joseph down a snowy road and I slipped.  I stayed on my feet, but pulled a muscle landing and catching myself.   If you believe that you have an occupational disease, when did you first have knowledge of the disability and it relationship to your employment?  N/A Witnesses to the Accident  Nobody      Nature of Injury or Occupational Disease  Strain  Part(s) of Body Injured or Affected  Lower Back Area (Lumbar Area & Lumbo-Sacral), N/A, N/A    I certify that the above is true and correct to the best of my knowledge and that I have provided " this information in order to obtain the benefits of [place]  I hereby authorize any physician, chiropractor, surgeon, practitioner, or other person, any hospital, including [facility] or [facility] hospital, any medical service organization, any insurance company, or other institution or organization to release to each other, any medical or other information, including benefits paid or payable, pertinent to this injury or disease, except information relative to diagnosis, treatment and/or counseling for AIDS, psychological conditions, alcohol or controlled substances, for which I must give specific authorization.  A Photostat of this authorization shall be as valid as the original.     Date   Place   Employee’s Signature   THIS REPORT MUST BE COMPLETED AND MAILED WITHIN 3 WORKING DAYS OF TREATMENT   Place  [facility]  Name of Facility  [facility]   Date  11/9/2020 Diagnosis  (S39.012A) Strain of lumbar paraspinous muscle, initial encounter Is there evidence the injured employee was under the              influence of alcohol and/or another controlled substance at the time of accident?   Hour  1:00 PM Description of Injury or Disease  The encounter diagnosis was Strain of lumbar paraspinous muscle, initial encounter. No   Treatment  - methylPREDNISolone (MEDROL DOSEPAK) 4 MG Tablet Therapy Pack; Follow schedule on package instructions.  Dispense: 21 Tab; Refill: 0  - cyclobenzaprine (FLEXERIL) 5 mg tablet; Take 1-2 Tabs by mouth 3 times a day as needed for Moderate Pain or Muscle Spasms.  Dispense: 15 Tab; Refill: 0  -Can take OTC Tylenol as directed for pain.   -Temperature Therapy: Heat or ice, whichever feels better.  -Gentle ROM back stretches and exercises. i      Have you advised the patient to remain off work five days or     more? No   X-Ray Findings      If Yes   From  "Date  To Date      From information given by the employee, together with medical evidence, can you directly connect this injury or occupational disease as job incurred?  Yes If No Full Duty    No Modified Duty  Yes   Is additional medical care by a physician indicated?  Yes If Modified Duty, Specify any Limitations / Restrictions  Note D-39.   Do you know of any previous injury or disease contributing to this condition or occupational disease?                            No   Date  11/9/2020 Print Doctor’s Name   JAZMÍN Squires I certify the employer’s copy of  this form was mailed on:   Address  [address]. [address] Insurer’s Use Only     [facility] Zip  [address]    Provider’s Tax ID Number  [id number] Telephone  Dept: [phone]      PREMA Douglas  Signature:     Degree          ORIGINAL-TREATING PHYSICIAN OR CHIROPRACTOR    PAGE 2-INSURER/TPA    PAGE 3-EMPLOYER    PAGE 4-EMPLOYEE        Form C-4 (rev.10/07)          BRIEF DESCRIPTION OF RIGHTS AND BENEFITS  (Pursuant to [place] 616[place])    Notice of Injury or Occupational Disease (Incident Report Form C-1): If an injury or occupational disease (OD) arises out of and in the course of employment, you must provide written notice to your employer as soon as practicable, but no later than 7 days after the accident or OD. Your employer shall maintain a sufficient supply of the required forms.     Claim for Compensation (Form C-4): If medical treatment is sought, the form C-4 is available at the place of initial treatment. A completed \"Claim for Compensation\" (Form C-4) must be filed within 90 days after an accident or OD. The treating physician or chiropractor must, within 3 working days after treatment, complete and mail to the employer, the employer's insurer and third-party , the Claim for Compensation.     Medical Treatment: If you require medical treatment for your on-the-job injury or OD, you may be required to " select a physician or chiropractor from a list provided by your workers’ compensation insurer, if it has contracted with an Organization for Managed Care (MCO) or Preferred Provider Organization (PPO) or providers of health care. If your employer has not entered into a contract with an MCO or PPO, you may select a physician or chiropractor from the Panel of Physicians and Chiropractors. Any medical costs related to your industrial injury or OD will be paid by your insurer.     Temporary Total Disability (TTD): If your doctor has certified that you are unable to work for a period of at least 5 consecutive days, or 5 cumulative days in a 20-day period, or places restrictions on you that your employer does not accommodate, you may be entitled to TTD compensation.     Temporary Partial Disability (TPD): If the wage you receive upon reemployment is less than the compensation for TTD to which you are entitled, the insurer may be required to pay you TPD compensation to make up the difference. TPD can only be paid for a maximum of 24 months.     Permanent Partial Disability (PPD): When your medical condition is stable and there is an indication of a PPD as a result of your injury or OD, within 30 days, your insurer must arrange for an evaluation by a rating physician or chiropractor to determine the degree of your PPD. The amount of your PPD award depends on the date of injury, the results of the PPD evaluation and your age and wage.     Permanent Total Disability (PTD): If you are medically certified by a treating physician or chiropractor as permanently and totally disabled and have been granted a PTD status by your insurer, you are entitled to receive monthly benefits not to exceed 66 2/3% of your average monthly wage. The amount of your PTD payments is subject to reduction if you previously received a PPD award.     Vocational Rehabilitation Services: You may be eligible for vocational rehabilitation services if you are  unable to return to the job due to a permanent physical impairment or permanent restrictions as a result of your injury or occupational disease.     Transportation and Per Nicole Reimbursement: You may be eligible for travel expenses and per nicole associated with medical treatment.     Reopening: You may be able to reopen your claim if your condition worsens after claim closure.     Appeal Process: If you disagree with a written determination issued by the insurer or the insurer does not respond to your request, you may appeal to the Department of Administration, , by following the instructions contained in your determination letter. You must appeal the determination within 70 days from the date of the determination letter at [address], [address], [place], [place] [address], or [address], [address], [place], [place] [address]. If you disagree with the  decision, you may appeal to the Department of Administration, . You must file your appeal within 30 days from the date of the  decision letter at [address], [address], [place], [place] [address], or [address] [facility] [address], [place] [address]. If you disagree with a decision of an , you may file a petition for judicial review with the District Court. You must do so within 30 days of the Appeal Officer’s decision. You may be represented by an  at your own expense or you may contact the [facility] for possible representation.     [place]  for Injured Workers (NAIW): If you disagree with a  decision, you may request that NAIW represent you without charge at an  Hearing. For information regarding denial of benefits, you may contact the [facility] at: [address], [address], [place] [address], [phone], or [address] [facility] [address], [place] [address], [phone]     To File a Complaint with  the Division: If you wish to file a complaint with the  of the Division of Industrial Relations (DIR), please contact the Workers’ Compensation Section, [address], [address], [place], [place] [address], telephone [phone]) [phone], or [address], [address], [place], [place] [address], telephone [phone].     For assistance with Workers’ Compensation Issues: You may contact the Office of the Governor Consumer Health Assistance, [address] [place] [address], [address], [place], [place] [address], Toll Free 1-478.868.2006, Web site: [email or web address].[email or web address], E-mail [email or web address].[email or web address].              __________________________________________________________________                              ___________________         Employee Name / Signature                                                                                                                     Date                                                                                                                                                                                       D-2 (rev. 01/20)

## 2020-11-09 NOTE — LETTER
"   [facility]  [address] [place] [address]. [address] - [place] [address]  Phone:  [phone] - Fax:  [phone]   Occupational Health Network Progress Report and Disability Certification  Date of Service: 11/9/2020   No Show:  No  Date / Time of Next Visit: 11/12/2020   Claim Information   Patient Name: Maximus Greenwood  Claim Number:     Employer:   *** Date of Injury: 11/8/2020     Insurer / TPA: [facility] *** ID / SSN: [id number]    Occupation: [employer] *** Diagnosis: The encounter diagnosis was Strain of lumbar paraspinous muscle, initial encounter.    Medical Information   Related to Industrial Injury? Yes ***   Subjective Complaints:  DOI: 11/8/2020. Patient reports walking downhill in snow, while carrying a joseph. His foot slipped forward, and he \"caught the joseph\". Felt pain across lower back. Did not fall. Pain 5/10. Dull, aching, throbbing pain. Bending over aggravates pain. Had difficulty sleeping due to pain. No numbness or weakness.  No loss of bowel or bladder. Denies previous back injury or pain. Denies second job.    Objective Findings: Musculoskeletal:      Lumbar back: He exhibits tenderness. He exhibits no bony tenderness, no swelling, no edema and no deformity.      Comments: Antalgic gait. No midline lumbar tenderness to palpation. Bilateral paraspinal tenderness to palpation. Strong and equal bilateral extremity strength.     Neurological:      Mental Status: He is oriented to person, place, and time.      Deep Tendon Reflexes:      Reflex Scores:       Patellar reflexes are 2+ on the right side and 2+ on the left side.       Pre-Existing Condition(s): None known.    Assessment:   Initial Visit    Status: Additional Care Required  Permanent Disability:No    Plan: MedicationMedication (NOT at Work)    Diagnostics:      Comments:       Disability Information   Status: Released to Restricted Duty    From:  11/9/2020  Through: 11/12/2020 Restrictions are: " Temporary   Physical Restrictions   Sitting:    Standing:    Stoopin hrs/day Bendin hrs/day   Squattin hrs/day Walking:    Climbin hrs/day Pushing:      Pulling:    Other:    Reaching Above Shoulder (L):   Reaching Above Shoulder (R):       Reaching Below Shoulder (L):    Reaching Below Shoulder (R):      Not to exceed Weight Limits   Carrying(hrs):   Weight Limit(lb): < or = to 10 pounds Lifting(hrs):   Weight  Limit(lb): < or = to 10 pounds   Comments: - methylPREDNISolone (MEDROL DOSEPAK) 4 MG Tablet Therapy Pack; Follow schedule on package instructions.  Dispense: 21 Tab; Refill: 0  - cyclobenzaprine (FLEXERIL) 5 mg tablet; Take 1-2 Tabs by mouth 3 times a day as needed for Moderate Pain or Muscle Spasms.  Dispense: 15 Tab; Refill: 0  -Discussed sedative effects of muscle relaxers. Not to be use at work or while driving.   -Can take OTC Tylenol as directed for pain.   -Temperature Therapy: Heat or ice, whichever feels better.  -Gentle ROM back stretches and exercises.   -Follow up in 3 days    Repetitive Actions   Hands: i.e. Fine Manipulations from Grasping:     Feet: i.e. Operating Foot Controls:     Driving / Operate Machinery:     Provider Name:   JAZMÍN Squires Physician Signature:  Physician Name:     Clinic Name / Location: [address]  [place] [address] Clinic Phone Number: Dept: [phone]   Appointment Time: 12:15 Pm Visit Start Time: 1:00 PM   Check-In Time:  12:28 Pm Visit Discharge Time: 2:31 Pm ***   Original-Treating Physician or Chiropractor    Page 2-Insurer/TPA    Page 3-Employer    Page 4-Employee

## 2020-11-09 NOTE — PROGRESS NOTES
"  Subjective:     Maximus Greenwood is a 35 y.o. male who presents for Work-Related Injury (DOI 11/8/2020, slipped on a down hil raod, carrying a joseph lower back hurts )      DOI: 11/8/2020. Patient reports walking downhill in snow, while carrying a joseph. His foot slipped forward, and he \"caught the joseph\". Felt pain across lower back. Did not fall. Pain 5/10. Dull, aching, throbbing pain. Bending over aggravates pain. Had difficulty sleeping due to pain. No numbness or weakness.  No loss of bowel or bladder. Denies previous back injury or pain. Denies second job.       Back Pain  This is a new problem. The pain is moderate. Pertinent negatives include no weakness.       Past Medical History:   Diagnosis Date   • ADD (attention deficit disorder)        No past surgical history on file.    Social History     Socioeconomic History   • Marital status: Single     Spouse name: Not on file   • Number of children: Not on file   • Years of education: Not on file   • Highest education level: Not on file   Occupational History   • Not on file   Social Needs   • Financial resource strain: Not on file   • Food insecurity     Worry: Not on file     Inability: Not on file   • Transportation needs     Medical: Not on file     Non-medical: Not on file   Tobacco Use   • Smoking status: Never Smoker   • Smokeless tobacco: Never Used   Substance and Sexual Activity   • Alcohol use: Yes     Alcohol/week: 0.0 oz     Frequency: Monthly or less     Drinks per session: 1 or 2     Binge frequency: Never   • Drug use: Yes     Frequency: 3.0 times per week     Types: Marijuana     Comment: reports using to help him focus   • Sexual activity: Yes     Partners: Female   Lifestyle   • Physical activity     Days per week: Not on file     Minutes per session: Not on file   • Stress: Not on file   Relationships   • Social connections     Talks on phone: Not on file     Gets together: Not on file     Attends [employer]ism service: Not on file     Active " "member of club or organization: Not on file     Attends meetings of clubs or organizations: Not on file     Relationship status: Not on file   • Intimate partner violence     Fear of current or ex partner: Not on file     Emotionally abused: Not on file     Physically abused: Not on file     Forced sexual activity: Not on file   Other Topics Concern   • Not on file   Social History Narrative   • Not on file        Family History   Problem Relation Age of Onset   • ADD / ADHD Father         No Known Allergies    Review of Systems   Musculoskeletal: Positive for back pain. Negative for falls.   Neurological: Negative for sensory change and weakness.   Psychiatric/Behavioral:        Low dose lithium, can take PRN per MD. Last dose today, prioir a week ago.    All other systems reviewed and are negative.       Objective:   /72 (BP Location: Right arm, Patient Position: Sitting, BP Cuff Size: Adult)   Pulse 87   Temp 36.8 °C (98.2 °F) (Temporal)   Resp 18   Ht 1.727 m (5' 8\")   Wt 68 kg (150 lb)   SpO2 97%   BMI 22.81 kg/m²     Physical Exam  Vitals signs reviewed.   Constitutional:       General: He is not in acute distress.     Appearance: He is well-developed.   HENT:      Head: Normocephalic and atraumatic.      Right Ear: External ear normal.      Left Ear: External ear normal.      Nose: Nose normal.   Eyes:      Conjunctiva/sclera: Conjunctivae normal.   Neck:      Musculoskeletal: Normal range of motion.   Cardiovascular:      Rate and Rhythm: Normal rate.   Pulmonary:      Effort: Pulmonary effort is normal.   Musculoskeletal: Normal range of motion.         General: Tenderness present. No swelling or deformity.      Lumbar back: He exhibits tenderness. He exhibits no bony tenderness, no swelling, no edema and no deformity.      Comments: Antalgic gait. No midline lumbar tenderness to palpation. Bilateral paraspinal tenderness to palpation. Strong and equal bilateral extremity strength.     Skin:    "  General: Skin is warm and dry.      Findings: No rash.   Neurological:      General: No focal deficit present.      Mental Status: He is alert and oriented to person, place, and time.      GCS: GCS eye subscore is 4. GCS verbal subscore is 5. GCS motor subscore is 6.      Deep Tendon Reflexes:      Reflex Scores:       Patellar reflexes are 2+ on the right side and 2+ on the left side.  Psychiatric:         Mood and Affect: Mood normal.         Speech: Speech normal.         Behavior: Behavior normal.         Thought Content: Thought content normal.         Judgment: Judgment normal.         Assessment/Plan:   1. Strain of lumbar paraspinous muscle, initial encounter  - methylPREDNISolone (MEDROL DOSEPAK) 4 MG Tablet Therapy Pack; Follow schedule on package instructions.  Dispense: 21 Tab; Refill: 0  - cyclobenzaprine (FLEXERIL) 5 mg tablet; Take 1-2 Tabs by mouth 3 times a day as needed for Moderate Pain or Muscle Spasms.  Dispense: 15 Tab; Refill: 0    -Discussed sedative effects of muscle relaxers. Not to be use at work or while driving.   -Can take OTC Tylenol as directed for pain.   -Temperature Therapy: Heat or ice, whichever feels better.  -Gentle ROM back stretches and exercises.   -Follow up in 3 days    Follow up emergently for weakness, loss of bowel or bladder, groin pain or numbness, fevers. Lithium not to be taken with flexeril.     Differential diagnosis, natural history, supportive care, and indications for immediate follow-up discussed.

## 2020-11-09 NOTE — LETTER
"   [facility] [place]  [address] [place] [address]. [address] - [place] [address]  Phone:  [phone] - Fax:  [phone]   [facility] Progress Report and Disability Certification  Date of Service: 11/9/2020   No Show:  No  Date / Time of Next Visit: 11/12/2020   Claim Information   Patient Name: Maximus Greenwood  Claim Number:     Employer:   *** Date of Injury: 11/8/2020     Insurer / TPA: [facility] *** ID / SSN: [id number]    Occupation: [employer] *** Diagnosis: The encounter diagnosis was Strain of lumbar paraspinous muscle, initial encounter.    Medical Information   Related to Industrial Injury? Yes ***   Subjective Complaints:  DOI: 11/8/2020. Patient reports walking downhill in snow, while carrying a joseph. His foot slipped forward, and he \"caught the joseph\". Felt pain across lower back. Did not fall. Pain 5/10. Dull, aching, throbbing pain. Bending over aggravates pain. Had difficulty sleeping due to pain. No numbness or weakness.  No loss of bowel or bladder. Denies previous back injury or pain. Denies second job.    Objective Findings: Musculoskeletal:      Lumbar back: He exhibits tenderness. He exhibits no bony tenderness, no swelling, no edema and no deformity.      Comments: Antalgic gait. No midline lumbar tenderness to palpation. Bilateral paraspinal tenderness to palpation. Strong and equal bilateral extremity strength.     Neurological:      Mental Status: He is oriented to person, place, and time.      Deep Tendon Reflexes:      Reflex Scores:       Patellar reflexes are 2+ on the right side and 2+ on the left side.       Pre-Existing Condition(s): None known.    Assessment:   Initial Visit    Status: Additional Care Required  Permanent Disability:No    Plan: MedicationMedication (NOT at Work)    Diagnostics:      Comments:       Disability Information   Status: Released to Restricted Duty    From:  11/9/2020  Through: 11/12/2020 Restrictions are: " Temporary   Physical Restrictions   Sitting:    Standing:    Stoopin hrs/day Bendin hrs/day   Squattin hrs/day Walking:    Climbin hrs/day Pushing:      Pulling:    Other:    Reaching Above Shoulder (L):   Reaching Above Shoulder (R):       Reaching Below Shoulder (L):    Reaching Below Shoulder (R):      Not to exceed Weight Limits   Carrying(hrs):   Weight Limit(lb): < or = to 10 pounds Lifting(hrs):   Weight  Limit(lb): < or = to 10 pounds   Comments: - methylPREDNISolone (MEDROL DOSEPAK) 4 MG Tablet Therapy Pack; Follow schedule on package instructions.  Dispense: 21 Tab; Refill: 0  - cyclobenzaprine (FLEXERIL) 5 mg tablet; Take 1-2 Tabs by mouth 3 times a day as needed for Moderate Pain or Muscle Spasms.  Dispense: 15 Tab; Refill: 0  -Discussed sedative effects of muscle relaxers. Not to be use at work or while driving.   -Can take OTC Tylenol as directed for pain.   -Temperature Therapy: Heat or ice, whichever feels better.  -Gentle ROM back stretches and exercises.   -Follow up in 3 days    Repetitive Actions   Hands: i.e. Fine Manipulations from Grasping:     Feet: i.e. Operating Foot Controls:     Driving / Operate Machinery:     Provider Name:   JAZMÍN Squires Physician Signature:  Physician Name:     Clinic Name / Location: [address]  [place] [address] Clinic Phone Number: Dept: [phone]   Appointment Time: 12:15 Pm Visit Start Time: 1:00 PM   Check-In Time:  12:28 Pm Visit Discharge Time: 2:31 Pm ***   Original-Treating Physician or Chiropractor    Page 2-Insurer/TPA    Page 3-Employer    Page 4-Employee

## 2020-11-09 NOTE — LETTER
"   [facility] [place]  [address] [place] [address]. [address] - [place] [address]  Phone:  [phone] - Fax:  [phone]   [facility] Progress Report and Disability Certification  Date of Service: 11/9/2020   No Show:  No  Date / Time of Next Visit: 11/12/2020   Claim Information   Patient Name: Maximus Greenwood  Claim Number:     Employer:   [employer] Date of Injury: 11/8/2020     Insurer / TPA: [facility]  ID / SSN: [id number]    Occupation: [employer]  Diagnosis: The encounter diagnosis was Strain of lumbar paraspinous muscle, initial encounter.    Medical Information   Related to Industrial Injury? Yes    Subjective Complaints:  DOI: 11/8/2020. Patient reports walking downhill in snow, while carrying a joseph. His foot slipped forward, and he \"caught the joseph\". Felt pain across lower back. Did not fall. Pain 5/10. Dull, aching, throbbing pain. Bending over aggravates pain. Had difficulty sleeping due to pain. No numbness or weakness.  No loss of bowel or bladder. Denies previous back injury or pain. Denies second job.    Objective Findings: Musculoskeletal:      Lumbar back: He exhibits tenderness. He exhibits no bony tenderness, no swelling, no edema and no deformity.      Comments: Antalgic gait. No midline lumbar tenderness to palpation. Bilateral paraspinal tenderness to palpation. Strong and equal bilateral extremity strength.     Neurological:      Mental Status: He is oriented to person, place, and time.      Deep Tendon Reflexes:      Reflex Scores:       Patellar reflexes are 2+ on the right side and 2+ on the left side.       Pre-Existing Condition(s): None known.    Assessment:   Initial Visit    Status: Additional Care Required  Permanent Disability:No    Plan: MedicationMedication (NOT at Work)    Diagnostics:      Comments:       Disability Information   Status: Released to Restricted Duty    From:  11/9/2020  Through: 11/12/2020 Restrictions are: " Temporary   Physical Restrictions   Sitting:    Standing:    Stoopin hrs/day Bendin hrs/day   Squattin hrs/day Walking:    Climbin hrs/day Pushing:      Pulling:    Other:    Reaching Above Shoulder (L):   Reaching Above Shoulder (R):       Reaching Below Shoulder (L):    Reaching Below Shoulder (R):      Not to exceed Weight Limits   Carrying(hrs):   Weight Limit(lb): < or = to 10 pounds Lifting(hrs):   Weight  Limit(lb): < or = to 10 pounds   Comments: - methylPREDNISolone (MEDROL DOSEPAK) 4 MG Tablet Therapy Pack; Follow schedule on package instructions.  Dispense: 21 Tab; Refill: 0  - cyclobenzaprine (FLEXERIL) 5 mg tablet; Take 1-2 Tabs by mouth 3 times a day as needed for Moderate Pain or Muscle Spasms.  Dispense: 15 Tab; Refill: 0  -Discussed sedative effects of muscle relaxers. Not to be use at work or while driving.   -Can take OTC Tylenol as directed for pain.   -Temperature Therapy: Heat or ice, whichever feels better.  -Gentle ROM back stretches and exercises.   -Follow up in 3 days    Repetitive Actions   Hands: i.e. Fine Manipulations from Grasping:     Feet: i.e. Operating Foot Controls:     Driving / Operate Machinery:     Provider Name:   JAZMÍN Squires Physician Signature:  Physician Name:     Clinic Name / Location: [address]  [place] [address] Clinic Phone Number: Dept: [phone]   Appointment Time: 12:15 Pm Visit Start Time: 1:00 PM   Check-In Time:  12:28 Pm Visit Discharge Time: 2:31 Pm    Original-Treating Physician or Chiropractor    Page 2-Insurer/TPA    Page 3-Employer    Page 4-Employee

## 2020-11-09 NOTE — LETTER
"   [facility] [place]  [address] [place] [address]. [address] - [place] [address]  Phone:  [phone] - Fax:  [phone]   [facility] Progress Report and Disability Certification  Date of Service: 11/9/2020   No Show:  No  Date / Time of Next Visit: 11/12/2020   Claim Information   Patient Name: Maximus Greenwood  Claim Number:     Employer:   *** Date of Injury: 11/8/2020     Insurer / TPA: [facility] *** ID / SSN: [id number]    Occupation: [employer] *** Diagnosis: The encounter diagnosis was Strain of lumbar paraspinous muscle, initial encounter.    Medical Information   Related to Industrial Injury? Yes ***   Subjective Complaints:  DOI: 11/8/2020. Patient reports walking downhill in snow, while carrying a joseph. His foot slipped forward, and he \"caught the joseph\". Felt pain across lower back. Did not fall. Pain 5/10. Dull, aching, throbbing pain. Bending over aggravates pain. Had difficulty sleeping due to pain. No numbness or weakness.  No loss of bowel or bladder. Denies previous back injury or pain. Denies second job.    Objective Findings: Musculoskeletal:      Lumbar back: He exhibits tenderness. He exhibits no bony tenderness, no swelling, no edema and no deformity.      Comments: Antalgic gait. No midline lumbar tenderness to palpation. Bilateral paraspinal tenderness to palpation. Strong and equal bilateral extremity strength.     Neurological:      Mental Status: He is oriented to person, place, and time.      Deep Tendon Reflexes:      Reflex Scores:       Patellar reflexes are 2+ on the right side and 2+ on the left side.       Pre-Existing Condition(s): None known.    Assessment:   Initial Visit    Status: Additional Care Required  Permanent Disability:No    Plan: MedicationMedication (NOT at Work)    Diagnostics:      Comments:       Disability Information   Status: Released to Restricted Duty    From:  11/9/2020  Through: 11/12/2020 Restrictions are: " Temporary   Physical Restrictions   Sitting:    Standing:    Stoopin hrs/day Bendin hrs/day   Squattin hrs/day Walking:    Climbin hrs/day Pushing:      Pulling:    Other:    Reaching Above Shoulder (L):   Reaching Above Shoulder (R):       Reaching Below Shoulder (L):    Reaching Below Shoulder (R):      Not to exceed Weight Limits   Carrying(hrs):   Weight Limit(lb): < or = to 10 pounds Lifting(hrs):   Weight  Limit(lb): < or = to 10 pounds   Comments: - methylPREDNISolone (MEDROL DOSEPAK) 4 MG Tablet Therapy Pack; Follow schedule on package instructions.  Dispense: 21 Tab; Refill: 0  - cyclobenzaprine (FLEXERIL) 5 mg tablet; Take 1-2 Tabs by mouth 3 times a day as needed for Moderate Pain or Muscle Spasms.  Dispense: 15 Tab; Refill: 0  -Discussed sedative effects of muscle relaxers. Not to be use at work or while driving.   -Can take OTC Tylenol as directed for pain.   -Temperature Therapy: Heat or ice, whichever feels better.  -Gentle ROM back stretches and exercises.   -Follow up in 3 days    Repetitive Actions   Hands: i.e. Fine Manipulations from Grasping:     Feet: i.e. Operating Foot Controls:     Driving / Operate Machinery:     Provider Name:   JAZMÍN Squires Physician Signature:  Physician Name:     Clinic Name / Location: [address]  [place] [address] Clinic Phone Number: Dept: [phone]   Appointment Time: 12:15 Pm Visit Start Time: 1:00 PM   Check-In Time:  12:28 Pm Visit Discharge Time: 2:31 Pm ***   Original-Treating Physician or Chiropractor    Page 2-Insurer/TPA    Page 3-Employer    Page 4-Employee

## 2020-11-12 ASSESSMENT — ENCOUNTER SYMPTOMS
SENSORY CHANGE: 0
FALLS: 0
WEAKNESS: 0

## 2020-11-14 ENCOUNTER — OCCUPATIONAL MEDICINE (OUTPATIENT)
Dept: URGENT CARE | Facility: PHYSICIAN GROUP | Age: 35
End: 2020-11-14
Payer: COMMERCIAL

## 2020-11-14 VITALS
OXYGEN SATURATION: 97 % | HEART RATE: 77 BPM | BODY MASS INDEX: 22.73 KG/M2 | TEMPERATURE: 97.7 F | RESPIRATION RATE: 18 BRPM | HEIGHT: 68 IN | WEIGHT: 150 LBS | SYSTOLIC BLOOD PRESSURE: 110 MMHG | DIASTOLIC BLOOD PRESSURE: 68 MMHG

## 2020-11-14 DIAGNOSIS — S39.012D STRAIN OF LUMBAR PARASPINOUS MUSCLE, SUBSEQUENT ENCOUNTER: ICD-10-CM

## 2020-11-14 DIAGNOSIS — S39.012A STRAIN OF LUMBAR PARASPINOUS MUSCLE, INITIAL ENCOUNTER: ICD-10-CM

## 2020-11-14 PROCEDURE — 99214 OFFICE O/P EST MOD 30 MIN: CPT | Performed by: PHYSICIAN ASSISTANT

## 2020-11-14 RX ORDER — CYCLOBENZAPRINE HCL 5 MG
5-10 TABLET ORAL 3 TIMES DAILY PRN
Qty: 15 TAB | Refills: 0 | Status: SHIPPED | OUTPATIENT
Start: 2020-11-14 | End: 2021-02-21

## 2020-11-14 ASSESSMENT — ENCOUNTER SYMPTOMS
BACK PAIN: 1
FEVER: 0
FOCAL WEAKNESS: 0
TINGLING: 0
SENSORY CHANGE: 0

## 2020-11-14 ASSESSMENT — FIBROSIS 4 INDEX: FIB4 SCORE: 0.43

## 2020-11-14 NOTE — PROGRESS NOTES
"Subjective:   Maximus Greenwood  is a 35 y.o. male who presents for Work-Related Injury (DOI 11/8/2020 lower back injury. feels tired )    DOI: 11/8/20  Second visit  ANISHA: Patient reports on the date of injury he was walking down hill when he slipped on snow while carrying a joseph.  Patient was seen in urgent care for first visit on 11/9/20.  At that time, patient was prescribed muscle relaxer and Medrol Dosepak.  Patient reports he has completed the steroid Dosepak and continues to take the muscle relaxer and only has a couple left.  He reports minimal improvement in pain.  Pain is rated at 3/10 at rest and 7/10 with certain activity.  Patient denies radiation of pain, numbness, tingling, weakness of the extremities.  Denies bowel or bladder incontinence or unexplained fevers.  Pain is located mostly around along the entire low back.  Denies prior back issues.  Patient does not have a second job.  Patient has not yet returned to work and will return to work tomorrow for his first day back.   HPI  Review of Systems   Constitutional: Negative for fever.   Musculoskeletal: Positive for back pain.   Neurological: Negative for tingling, sensory change and focal weakness.   All other systems reviewed and are negative.    No Known Allergies  Reviewed past medical, surgical , social and family history.  Reviewed prescription and over-the-counter medications with patient and electronic health record today.     Objective:   /68   Pulse 77   Temp 36.5 °C (97.7 °F) (Temporal)   Resp 18   Ht 1.727 m (5' 8\")   Wt 68 kg (150 lb)   SpO2 97%   BMI 22.81 kg/m²   Physical Exam  Vitals signs reviewed.   Constitutional:       General: He is not in acute distress.     Appearance: He is well-developed. He is not ill-appearing or toxic-appearing.   HENT:      Head: Normocephalic and atraumatic.      Jaw: There is normal jaw occlusion.      Right Ear: External ear normal.      Left Ear: External ear normal.      Nose: Nose " normal.      Mouth/Throat:      Mouth: Mucous membranes are moist.   Eyes:      General: Lids are normal.      Extraocular Movements: Extraocular movements intact.      Conjunctiva/sclera: Conjunctivae normal.      Pupils: Pupils are equal, round, and reactive to light.   Neck:      Musculoskeletal: Normal range of motion and neck supple.   Cardiovascular:      Rate and Rhythm: Normal rate and regular rhythm.      Heart sounds: Normal heart sounds.   Pulmonary:      Effort: Pulmonary effort is normal. No respiratory distress.      Breath sounds: Normal breath sounds.   Musculoskeletal: Normal range of motion.      Lumbar back: He exhibits tenderness, pain and spasm. He exhibits normal range of motion, no bony tenderness, no swelling, no edema and no deformity.        Back:    Skin:     General: Skin is warm and dry.      Findings: No rash.   Neurological:      Mental Status: He is alert and oriented to person, place, and time.      Cranial Nerves: No cranial nerve deficit.      Sensory: No sensory deficit.      Motor: Motor function is intact.      Coordination: Coordination is intact.   Psychiatric:         Attention and Perception: Attention normal.         Mood and Affect: Mood and affect normal.         Speech: Speech normal.         Behavior: Behavior normal.         Thought Content: Thought content normal.         Cognition and Memory: Cognition normal.         Judgment: Judgment normal.       Lumbar spine: No step-off or deformity, no spinous process tenderness.  Tenderness of the bilateral lumbar paravertebral muscles with spasm noted.  Patient exhibits no limitation in range of motion with rotation, lateral bend, forward bend or extension.  Negative straight leg raise bilateral.  Motor strength 5/5 bilateral lower extremities.  Sensation grossly intact.  DTRs 2+ equal bilateral lower extremities.   Assessment/Plan:   1. Strain of lumbar paraspinous muscle, subsequent encounter    2. Strain of lumbar  paraspinous muscle, initial encounter  - cyclobenzaprine (FLEXERIL) 5 mg tablet; Take 1-2 Tabs by mouth 3 times a day as needed for Moderate Pain or Muscle Spasms.  Dispense: 15 Tab; Refill: 0     Refill provided on Flexeril.  Patient is counseled on not taking this medication while at work or driving as this will cause drowsiness.  Patient is counseled regarding medication interaction with his current chronic medication and signs and symptoms to observe.  Recommend over-the-counter nonsteroidal anti-inflammatory medication not to exceed recommended daily dose.  Also encouraged moist heat and gentle stretching and walking.  Patient has not yet returned to work.  Therefore, we will keep his work restrictions as noted above and see him in reevaluation in 1 week's time.    Patient does have a prescription on file for lithium.  Patient is counseled on potential risk for serotonin syndrome.  Patient reports that he is not taking the lithium while taking the Flexeril.    [place] D-39 completed.       Differential diagnosis, natural history, supportive care, and indications for immediate follow-up discussed.     Upon entering exam room I ensured patient was wearing a mask.  This provider wore appropriate PPE throughout entire visit.  Patient wore mask entire visit except for a brief period while examining oropharynx.    Red flag warning symptoms and strict ER/follow-up precautions given.  The patient demonstrated a good understanding and agreed with the treatment plan.  Please note that this note was created using voice recognition speech to text software. Every effort has been made to correct obvious errors.  However, I expect there are errors of grammar and possibly context that were not discovered prior to finalizing the note  ELKIN Gaxiola PA-C

## 2020-11-14 NOTE — LETTER
[facility]  [address] [place] [address]. [address] - [place] [address]  Phone:  [phone] - Fax:  [phone]   [facility] Progress Report and Disability Certification  Date of Service: 11/14/2020   No Show:  No  Date / Time of Next Visit: 11/21/2020 @ 9:00 AM   Claim Information   Patient Name: Maximus Greenwood  Claim Number:     Employer:   [employer] Date of Injury: 11/8/2020     Insurer / TPA: [facility]  ID / SSN:     Occupation: Roadside Assistance  Diagnosis: The encounter diagnosis was Strain of lumbar paraspinous muscle, subsequent encounter.    Medical Information   Related to Industrial Injury? Yes    Subjective Complaints:  DOI: 11/8/20  Second visit  ANISHA: Patient reports on the date of injury he was walking down hill when he slipped on snow while carrying a joseph.  Patient was seen in urgent care for first visit on 11/9/20.  At that time, patient was prescribed muscle relaxer and Medrol Dosepak.  Patient reports he has completed the steroid Dosepak and continues to take the muscle relaxer and only has a couple left.  He reports minimal improvement in pain.  Pain is rated at 3/10 at rest and 7/10 with certain activity.  Patient denies radiation of pain, numbness, tingling, weakness of the extremities.  Denies bowel or bladder incontinence or unexplained fevers.  Pain is located mostly around along the entire low back.  Denies prior back issues.  Patient does not have a second job.  Patient has not yet returned to work and will return to work tomorrow for his first day back.   Objective Findings: Lumbar spine: No step-off or deformity, no spinous process tenderness.  Tenderness of the bilateral lumbar paravertebral muscles with spasm noted.  Patient exhibits no limitation in range of motion with rotation, lateral bend, forward bend or extension.  Negative straight leg raise bilateral.  Motor strength 5/5 bilateral lower extremities.  Sensation  grossly intact.  DTRs 2+ equal bilateral lower extremities.   Pre-Existing Condition(s):     Assessment:   Condition Improved    Status: Additional Care Required  Permanent Disability:No    Plan:      Diagnostics:      Comments:       Disability Information   Status: Released to Restricted Duty    From:  2020  Through: 2020 Restrictions are: Temporary   Physical Restrictions   Sitting:    Standing:    Stoopin hrs/day Bendin hrs/day   Squattin hrs/day Walking:    Climbin hrs/day Pushing:      Pulling:    Other:    Reaching Above Shoulder (L):   Reaching Above Shoulder (R):       Reaching Below Shoulder (L):    Reaching Below Shoulder (R):      Not to exceed Weight Limits   Carrying(hrs):   Weight Limit(lb): < or = to 10 pounds Lifting(hrs):   Weight  Limit(lb): < or = to 10 pounds   Comments: Refill provided on Flexeril.  Patient is counseled on not taking this medication while at work or driving as this will cause drowsiness.  Patient is counseled regarding medication interaction with his current chronic medication and signs and symptoms to observe.  Recommend over-the-counter nonsteroidal anti-inflammatory medication not to exceed recommended daily dose.  Also encouraged moist heat and gentle stretching and walking.  Patient has not yet returned to work.  Therefore, we will keep his work restrictions as noted above and see him in reevaluation in 1 week's time.    Repetitive Actions   Hands: i.e. Fine Manipulations from Grasping:     Feet: i.e. Operating Foot Controls:     Driving / Operate Machinery:     Provider Name:   Sowmya Gaxiola P.A.-C. Physician Signature:  Physician Name:     Clinic Name / Location: [address]  [place] [address] Clinic Phone Number: Dept: [phone]   Appointment Time: 9:10 Am Visit Start Time: 9:12 AM   Check-In Time:  9:08 Am Visit Discharge Time: 10:18 AM   Original-Treating Physician or Chiropractor     Page 2-Insurer/TPA    Page 3-Employer    Page 4-Employee

## 2020-11-21 ENCOUNTER — OCCUPATIONAL MEDICINE (OUTPATIENT)
Dept: URGENT CARE | Facility: PHYSICIAN GROUP | Age: 35
End: 2020-11-21
Payer: COMMERCIAL

## 2020-11-21 VITALS
BODY MASS INDEX: 23.7 KG/M2 | SYSTOLIC BLOOD PRESSURE: 112 MMHG | DIASTOLIC BLOOD PRESSURE: 72 MMHG | HEART RATE: 89 BPM | HEIGHT: 69 IN | OXYGEN SATURATION: 98 % | WEIGHT: 160 LBS | TEMPERATURE: 97.5 F

## 2020-11-21 DIAGNOSIS — S39.012D STRAIN OF LUMBAR PARASPINOUS MUSCLE, SUBSEQUENT ENCOUNTER: ICD-10-CM

## 2020-11-21 PROCEDURE — 99213 OFFICE O/P EST LOW 20 MIN: CPT | Performed by: NURSE PRACTITIONER

## 2020-11-21 ASSESSMENT — ENCOUNTER SYMPTOMS: BACK PAIN: 1

## 2020-11-21 ASSESSMENT — FIBROSIS 4 INDEX: FIB4 SCORE: 0.43

## 2020-11-21 NOTE — PROGRESS NOTES
"Subjective:      Maximus Greenwood is a 35 y.o. male who presents with Back Pain (workmans comp follow up )      DOI: 11/8/20  3rd visit  ANISHA: Patient reports on the date of injury he was walking down hill when he slipped on snow while carrying a joseph.  Patient was seen in urgent care for first visit on 11/9/20.  At that time, patient was prescribed muscle relaxer and Medrol Dosepak.  Patient reports he has completed the steroid Dosepak and continues to take the muscle relaxer and only has a couple left.  He reports significant improvement in pain, admits he is approx. 90% better since DOI. Patient denies radiation of pain, numbness, tingling, weakness of the extremities.  Denies bowel or bladder incontinence or unexplained fevers.  Reports mild soreness to lower back at end of day.  Denies prior back issues.  Patient does not have a second job.  Tolerating light duty, would like to advance restrictions.     HPI    Review of Systems   Musculoskeletal: Positive for back pain (resolved).   All other systems reviewed and are negative.      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   Constitutional:       Appearance: Normal appearance.   HENT:      Head: Normocephalic and atraumatic.      Nose: Nose normal.      Mouth/Throat:      Mouth: Mucous membranes are moist.   Eyes:      Pupils: Pupils are equal, round, and reactive to light.   Neck:      Musculoskeletal: Normal range of motion.   Cardiovascular:      Rate and Rhythm: Normal rate and " regular rhythm.   Pulmonary:      Effort: Pulmonary effort is normal.   Musculoskeletal: Normal range of motion.   Skin:     General: Skin is warm and dry.      Capillary Refill: Capillary refill takes less than 2 seconds.   Neurological:      General: No focal deficit present.      Mental Status: He is alert and oriented to person, place, and time. Mental status is at baseline.   Psychiatric:         Mood and Affect: Mood normal.         Speech: Speech normal.         Thought Content: Thought content normal.         Judgment: Judgment normal.         Lumbar spine: No step-off or deformity, no spinous process tenderness.  Non tender lumbar spine.  Patient exhibits no limitation in range of motion with rotation, lateral bend, forward bend or extension.  Negative straight leg raise bilateral.  Motor strength 5/5 bilateral lower extremities.  Sensation grossly intact.  DTRs 2+ equal bilateral lower extremities.       Assessment/Plan:        1. Strain of lumbar paraspinous muscle, subsequent encounter    Advance to full duty  FV in one week, anticipate discharge MMI at that time

## 2020-11-21 NOTE — LETTER
[facility]  [address] [place] [address]. [address] - [place] [address]  Phone:  [phone] - Fax:  [phone]   [facility] Progress Report and Disability Certification  Date of Service: 11/21/2020   No Show:  No  Date / Time of Next Visit: 11/28/2020@9:00AM   Claim Information   Patient Name: Maximus Greenwood  Claim Number:     Employer:   [employer] Date of Injury: 11/8/2020     Insurer / TPA: [facility]  ID / SSN:     Occupation: Roadside Assistance  Diagnosis: The encounter diagnosis was Strain of lumbar paraspinous muscle, subsequent encounter.    Medical Information   Related to Industrial Injury? Yes    Subjective Complaints:  DOI: 11/8/20  3rd visit  ANISHA: Patient reports on the date of injury he was walking down hill when he slipped on snow while carrying a joseph.  Patient was seen in urgent care for first visit on 11/9/20.  At that time, patient was prescribed muscle relaxer and Medrol Dosepak.  Patient reports he has completed the steroid Dosepak and continues to take the muscle relaxer and only has a couple left.  He reports significant improvement in pain, admits he is approx. 90% better since DOI. Patient denies radiation of pain, numbness, tingling, weakness of the extremities.  Denies bowel or bladder incontinence or unexplained fevers.  Reports mild soreness to lower back at end of day.  Denies prior back issues.  Patient does not have a second job.  Tolerating light duty, would like to advance restrictions.   Objective Findings: Lumbar spine: No step-off or deformity, no spinous process tenderness.  Non tender lumbar spine.  Patient exhibits no limitation in range of motion with rotation, lateral bend, forward bend or extension.  Negative straight leg raise bilateral.  Motor strength 5/5 bilateral lower extremities.  Sensation grossly intact.  DTRs 2+ equal bilateral lower extremities.   Pre-Existing Condition(s):     Assessment:    Condition Improved    Status: Additional Care Required  Permanent Disability:No    Plan:      Diagnostics:      Comments:  Advance to full duty  FV in one week, anticipate discharge MMI at that time    Disability Information   Status: Released to Full Duty    From:  11/21/2020  Through: 11/28/2020 Restrictions are:     Physical Restrictions   Sitting:    Standing:    Stooping:    Bending:      Squatting:    Walking:    Climbing:    Pushing:      Pulling:    Other:    Reaching Above Shoulder (L):   Reaching Above Shoulder (R):       Reaching Below Shoulder (L):    Reaching Below Shoulder (R):      Not to exceed Weight Limits   Carrying(hrs):   Weight Limit(lb):   Lifting(hrs):   Weight  Limit(lb):     Comments:      Repetitive Actions   Hands: i.e. Fine Manipulations from Grasping:     Feet: i.e. Operating Foot Controls:     Driving / Operate Machinery:     Provider Name:   JAZMÍN Lopez Physician Signature:  Physician Name:     Clinic Name / Location: [address]  [place] [address] Clinic Phone Number: Dept: [phone]   Appointment Time: 9:10 Am Visit Start Time: 9:12 AM   Check-In Time:  9:08 Am Visit Discharge Time:  10:13AM   Original-Treating Physician or Chiropractor    Page 2-Insurer/TPA    Page 3-Employer    Page 4-Employee

## 2020-12-31 DIAGNOSIS — Z31.69 INFERTILITY COUNSELING: ICD-10-CM

## 2021-01-07 ENCOUNTER — HOSPITAL ENCOUNTER (OUTPATIENT)
Facility: MEDICAL CENTER | Age: 36
End: 2021-01-07
Attending: OBSTETRICS & GYNECOLOGY
Payer: COMMERCIAL

## 2021-01-07 ENCOUNTER — APPOINTMENT (OUTPATIENT)
Dept: LAB | Facility: MEDICAL CENTER | Age: 36
End: 2021-01-07
Attending: OBSTETRICS & GYNECOLOGY
Payer: COMMERCIAL

## 2021-02-20 ENCOUNTER — OCCUPATIONAL MEDICINE (OUTPATIENT)
Dept: URGENT CARE | Facility: PHYSICIAN GROUP | Age: 36
End: 2021-02-20
Payer: COMMERCIAL

## 2021-02-20 VITALS
BODY MASS INDEX: 24.88 KG/M2 | HEART RATE: 108 BPM | DIASTOLIC BLOOD PRESSURE: 82 MMHG | HEIGHT: 69 IN | OXYGEN SATURATION: 96 % | SYSTOLIC BLOOD PRESSURE: 128 MMHG | RESPIRATION RATE: 18 BRPM | TEMPERATURE: 98.3 F | WEIGHT: 168 LBS

## 2021-02-20 DIAGNOSIS — S91.332A PUNCTURE WOUND OF LEFT FOOT, INITIAL ENCOUNTER: ICD-10-CM

## 2021-02-20 PROCEDURE — 99213 OFFICE O/P EST LOW 20 MIN: CPT | Performed by: FAMILY MEDICINE

## 2021-02-20 ASSESSMENT — FIBROSIS 4 INDEX: FIB4 SCORE: 0.43

## 2021-02-20 ASSESSMENT — PAIN SCALES - GENERAL: PAINLEVEL: 6=MODERATE PAIN

## 2021-02-20 NOTE — LETTER
[facility]  [address]  [place] [address]  Phone:  [phone] - Fax:  [phone]   Occ[facility]ational [facility] Progress Report and Disability Certification  Date of Service: 2/20/2021   No Show:  No  Date / Time of Next Visit: 2/22/2021   Claim Information   Patient Name: Maximus Greenwood  Claim Number:     Employer:   [employer] Date of Injury: 2/20/2021     Insurer / TPA: [employer]  ID / SSN:     Occupation: Roadside Asst Provider   Diagnosis: The encounter diagnosis was Puncture wound of left foot, initial encounter.    Medical Information   Related to Industrial Injury? Yes    Subjective Complaints:  DOI: 2/20/2021  Puncture wound left foot.  Nail went through the bottom of his work boot.  He felt the nail when he stepped down and was able to lift his foot up off of the nail.  He did not need to pull it out.  He does not suspect bony involvement.  No active bleeding.  His tetanus is up-to-date.  Pain is moderate severity and worse with direct pressure.  No prior injury.  No second job or outside activity contributing.   Objective Findings: Left foot: Small puncture wound plantar aspect of midfoot.  No point bony tenderness.  No active bleeding.  No apparent foreign body.  Distal neurovascular intact.   Pre-Existing Condition(s):     Assessment:   Initial Visit    Status: Additional Care Required  Permanent Disability:No    Plan:   Comments:Wound irrigated copiously with sterile normal saline.  Dressing placed.  Wound care.    Diagnostics:   Comments:None    Comments:       Disability Information   Status: Released to Restricted Duty    From:  2/20/2021  Through: 2/22/2021 Restrictions are: Temporary   Physical Restrictions   Sitting:    Standing:  < or = to 2 hrs/day Stooping:    Bending:      Squatting:    Walking:  < or = to 2 hrs/day Climbing:    Pushing:      Pulling:    Other:    Reaching Above Shoulder (L):   Reaching Above Shoulder (R):        Reaching Below Shoulder (L):    Reaching Below Shoulder (R):      Not to exceed Weight Limits   Carrying(hrs):   Weight Limit(lb):   Lifting(hrs):   Weight  Limit(lb):     Comments:      Repetitive Actions   Hands: i.e. Fine Manipulations from Grasping:     Feet: i.e. Operating Foot Controls:     Driving / Operate Machinery:     Provider Name:   Uziel Sadler M.D. Physician Signature:  Physician Name:     Clinic Name / Location: [address] [address] Clinic Phone Number: Dept: [phone]   Appointment Time: 4:55 Pm Visit Start Time: 5:20 PM   Check-In Time:  5:18 Pm Visit Discharge Time:  5:27p   Original-Treating Physician or Chiropractor    Page 2-Insurer/TPA    Page 3-Employer    Page 4-Employee
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
## 2021-02-21 ENCOUNTER — OCCUPATIONAL MEDICINE (OUTPATIENT)
Dept: URGENT CARE | Facility: PHYSICIAN GROUP | Age: 36
End: 2021-02-21
Payer: COMMERCIAL

## 2021-02-21 ENCOUNTER — HOSPITAL ENCOUNTER (OUTPATIENT)
Dept: RADIOLOGY | Facility: MEDICAL CENTER | Age: 36
End: 2021-02-21
Attending: PHYSICIAN ASSISTANT
Payer: COMMERCIAL

## 2021-02-21 VITALS
DIASTOLIC BLOOD PRESSURE: 84 MMHG | WEIGHT: 168 LBS | TEMPERATURE: 97 F | RESPIRATION RATE: 16 BRPM | HEART RATE: 91 BPM | SYSTOLIC BLOOD PRESSURE: 122 MMHG | OXYGEN SATURATION: 98 % | BODY MASS INDEX: 24.88 KG/M2 | HEIGHT: 69 IN

## 2021-02-21 DIAGNOSIS — S91.332D PUNCTURE WOUND OF LEFT FOOT, SUBSEQUENT ENCOUNTER: ICD-10-CM

## 2021-02-21 PROCEDURE — 73630 X-RAY EXAM OF FOOT: CPT | Mod: LT

## 2021-02-21 PROCEDURE — 99214 OFFICE O/P EST MOD 30 MIN: CPT | Performed by: PHYSICIAN ASSISTANT

## 2021-02-21 RX ORDER — CEPHALEXIN 500 MG/1
500 CAPSULE ORAL 4 TIMES DAILY
Qty: 20 CAPSULE | Refills: 0 | Status: SHIPPED | OUTPATIENT
Start: 2021-02-21 | End: 2021-02-26

## 2021-02-21 ASSESSMENT — FIBROSIS 4 INDEX: FIB4 SCORE: 0.43

## 2021-02-21 ASSESSMENT — ENCOUNTER SYMPTOMS
MYALGIAS: 0
BRUISES/BLEEDS EASILY: 0
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
## 2021-02-21 NOTE — PROGRESS NOTES
"Subjective:      Maximus Greenwood is a 35 y.o. male who presents with Foot Injury (NEW WC DOI 2/20/21, stepped on nail, L foot)      DOI: 2/20/2021  Puncture wound left foot.  Nail went through the bottom of his work boot.  He felt the nail when he stepped down and was able to lift his foot up off of the nail.  He did not need to pull it out.  He does not suspect bony involvement.  No active bleeding.  His tetanus is up-to-date.  Pain is moderate severity and worse with direct pressure.  No prior injury.  No second job or outside activity contributing.     HPI    Review of Systems   Musculoskeletal: Negative for myalgias.   Skin: Negative for itching and rash.   Endo/Heme/Allergies: Does not bruise/bleed easily.          Objective:     /82   Pulse (!) 108   Temp 36.8 °C (98.3 °F) (Temporal)   Resp 18   Ht 1.753 m (5' 9\")   Wt 76.2 kg (168 lb)   SpO2 96%   BMI 24.81 kg/m²      Physical Exam  Constitutional:       Appearance: Normal appearance.   Skin:     General: Skin is warm and dry.   Neurological:      General: No focal deficit present.      Mental Status: He is alert and oriented to person, place, and time.         Left foot: Small puncture wound plantar aspect of midfoot.  No point bony tenderness.  No active bleeding.  No apparent foreign body.  Distal neurovascular intact.       Assessment/Plan:        1. Puncture wound of left foot, initial encounter    Differential diagnosis, natural history, supportive care, and indications for immediate follow-up discussed at length.     Irrigated copiously and dressed in clinic.  I do not suspect deep structure or bony involvement.  Keep wound clean and dry.  Light duty on D 39.  Follow-up in 2 days.    "

## 2021-02-22 ENCOUNTER — OCCUPATIONAL MEDICINE (OUTPATIENT)
Dept: URGENT CARE | Facility: PHYSICIAN GROUP | Age: 36
End: 2021-02-22
Payer: COMMERCIAL

## 2021-02-22 VITALS
BODY MASS INDEX: 24.88 KG/M2 | DIASTOLIC BLOOD PRESSURE: 70 MMHG | HEIGHT: 69 IN | OXYGEN SATURATION: 98 % | SYSTOLIC BLOOD PRESSURE: 122 MMHG | RESPIRATION RATE: 14 BRPM | TEMPERATURE: 98.2 F | WEIGHT: 168 LBS | HEART RATE: 90 BPM

## 2021-02-22 DIAGNOSIS — S91.332D PUNCTURE WOUND OF LEFT FOOT, SUBSEQUENT ENCOUNTER: ICD-10-CM

## 2021-02-22 PROCEDURE — 99213 OFFICE O/P EST LOW 20 MIN: CPT | Performed by: PHYSICIAN ASSISTANT

## 2021-02-22 ASSESSMENT — FIBROSIS 4 INDEX: FIB4 SCORE: 0.43
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
## 2021-02-25 ENCOUNTER — OCCUPATIONAL MEDICINE (OUTPATIENT)
Dept: URGENT CARE | Facility: PHYSICIAN GROUP | Age: 36
End: 2021-02-25
Payer: COMMERCIAL

## 2021-02-25 VITALS
SYSTOLIC BLOOD PRESSURE: 124 MMHG | HEART RATE: 71 BPM | DIASTOLIC BLOOD PRESSURE: 82 MMHG | RESPIRATION RATE: 13 BRPM | TEMPERATURE: 97.4 F | WEIGHT: 160 LBS | BODY MASS INDEX: 24.25 KG/M2 | OXYGEN SATURATION: 100 % | HEIGHT: 68 IN

## 2021-02-25 DIAGNOSIS — S91.332D PUNCTURE WOUND OF LEFT FOOT, SUBSEQUENT ENCOUNTER: ICD-10-CM

## 2021-02-25 DIAGNOSIS — L03.116 CELLULITIS OF LEFT FOOT: ICD-10-CM

## 2021-02-25 PROCEDURE — 99213 OFFICE O/P EST LOW 20 MIN: CPT | Performed by: NURSE PRACTITIONER

## 2021-02-25 ASSESSMENT — FIBROSIS 4 INDEX: FIB4 SCORE: 0.43

## 2021-02-25 ASSESSMENT — PAIN SCALES - GENERAL: PAINLEVEL: 2=MINIMAL-SLIGHT

## 2021-02-25 ASSESSMENT — ENCOUNTER SYMPTOMS
FEVER: 0
CHILLS: 0

## 2021-02-25 NOTE — PROGRESS NOTES
"Subjective:      Maximus Greenwood is a 35 y.o. male who presents with Follow-Up ()      DOI: 2/20/21  Fourth Visit    Patient returns today for follow-up on puncture wound to the plantar aspect of the left foot.  Patient was initially seen on 2/20 after a nail went through the bottom of his work boot.  He subsequently returned after the area became red and inflamed consistent with infection.  Patient was started on Keflex.  Last visit for this was on 2/22 and there was noted improvement at that visit.  Patient states he has continued to improve.  All redness has resolved.  He does still have point tenderness over the wound and directly over the dorsal aspect of the foot where he initially had erythema and redness.  Weightbearing is just mildly painful.  Patient believes he is ready for a trial of full duty at work.     Other  This is a new problem. Episode onset: 2/20/21. The problem occurs constantly. The problem has been gradually improving. Pertinent negatives include no chills or fever. The treatment provided significant relief.       Review of Systems   Constitutional: Negative for chills and fever.   Musculoskeletal:        Left foot pain/tenderness    All other systems reviewed and are negative.         Objective:     /82   Pulse 71   Temp 36.3 °C (97.4 °F)   Resp 13   Ht 1.727 m (5' 8\")   Wt 72.6 kg (160 lb)   SpO2 100%   BMI 24.33 kg/m²      Physical Exam  Vitals reviewed.   Constitutional:       Appearance: Normal appearance.   Musculoskeletal:        Feet:    Neurological:      Mental Status: He is alert.         There is a healing puncture wound to the plantar aspect of the left foot.  There is no redness or erythema to the dorsal aspect.  There is point tenderness to the area that was affected and erythematous before however, and there is point tenderness over the puncture wound.  Patient has a mildly antalgic gait.       Assessment/Plan:        1. Puncture wound of left foot, subsequent " encounter  2. Cellulitis of left foot    1 week trial of full duty  Return in 1 week, if patient is tolerating full duty and continuing to improve we will discharge for MMI  Return otherwise for any further questions or concerns

## 2021-02-25 NOTE — LETTER
[facility] [address] [place] [address]  Phone:  [phone] - Fax:  [phone]   [facility] Progress Report and Disability Certification  Date of Service: 2/25/2021   No Show:  No  Date / Time of Next Visit: 3/4/2021   Claim Information   Patient Name: Maximus Greenwood  Claim Number:     Employer:   [employer] Date of Injury: 2/20/2021     Insurer / TPA: [employer]  ID / SSN:     Occupation: Roadside Asst Provider   Diagnosis: Diagnoses of Puncture wound of left foot, subsequent encounter and Cellulitis of left foot were pertinent to this visit.    Medical Information   Related to Industrial Injury? Yes    Subjective Complaints:  DOI: 2/20/21  Fourth Visit    Patient returns today for follow-up on puncture wound to the plantar aspect of the left foot.  Patient was initially seen on 2/20 after a nail went through the bottom of his work boot.  He subsequently returned after the area became red and inflamed consistent with infection.  Patient was started on Keflex.  Last visit for this was on 2/22 and there was noted improvement at that visit.  Patient states he has continued to improve.  All redness has resolved.  He does still have point tenderness over the wound and directly over the dorsal aspect of the foot where he initially had erythema and redness.  Weightbearing is just mildly painful.  Patient believes he is ready for a trial of full duty at work.   Objective Findings: There is a healing puncture wound to the plantar aspect of the left foot.  There is no redness or erythema to the dorsal aspect.  There is point tenderness to the area that was affected and erythematous before however, and there is point tenderness over the puncture wound.  Patient has a mildly antalgic gait.   Pre-Existing Condition(s):     Assessment:   Condition Improved    Status: Additional Care Required  Permanent Disability:No    Plan:      Diagnostics:      Comments:        Disability Information   Status: Released to Full Duty    From:  2/25/2021  Through: 3/4/2021 Restrictions are:     Physical Restrictions   Sitting:    Standing:    Stooping:    Bending:      Squatting:    Walking:    Climbing:    Pushing:      Pulling:    Other:    Reaching Above Shoulder (L):   Reaching Above Shoulder (R):       Reaching Below Shoulder (L):    Reaching Below Shoulder (R):      Not to exceed Weight Limits   Carrying(hrs):   Weight Limit(lb):   Lifting(hrs):   Weight  Limit(lb):     Comments: Patient may resume full duty.  We will follow up with him in 7 days and if he is tolerating full duty and continuing to improve we will discharge for MMI.    Repetitive Actions   Hands: i.e. Fine Manipulations from Grasping:     Feet: i.e. Operating Foot Controls:     Driving / Operate Machinery:     Provider Name:   Cathey J Hamman, A.P.N. Physician Signature:  Physician Name:     Clinic Name / Location: [address] [address] Clinic Phone Number: Dept: [phone]   Appointment Time: 1:30 Pm Visit Start Time: 9:43 AM   Check-In Time:  9:40 Am Visit Discharge Time: 10:20 AM   Original-Treating Physician or Chiropractor    Page 2-Insurer/TPA    Page 3-Employer    Page 4-Employee

## 2021-03-04 ENCOUNTER — OCCUPATIONAL MEDICINE (OUTPATIENT)
Dept: URGENT CARE | Facility: PHYSICIAN GROUP | Age: 36
End: 2021-03-04
Payer: COMMERCIAL

## 2021-03-04 VITALS
BODY MASS INDEX: 24.25 KG/M2 | WEIGHT: 160 LBS | RESPIRATION RATE: 18 BRPM | HEIGHT: 68 IN | OXYGEN SATURATION: 99 % | SYSTOLIC BLOOD PRESSURE: 100 MMHG | TEMPERATURE: 97.1 F | HEART RATE: 80 BPM | DIASTOLIC BLOOD PRESSURE: 60 MMHG

## 2021-03-04 DIAGNOSIS — S91.332D PUNCTURE WOUND OF LEFT FOOT, SUBSEQUENT ENCOUNTER: ICD-10-CM

## 2021-03-04 PROCEDURE — 99213 OFFICE O/P EST LOW 20 MIN: CPT | Performed by: NURSE PRACTITIONER

## 2021-03-04 ASSESSMENT — FIBROSIS 4 INDEX: FIB4 SCORE: 0.43

## 2021-03-04 ASSESSMENT — ENCOUNTER SYMPTOMS
NEUROLOGICAL NEGATIVE: 1
SENSORY CHANGE: 0
CONSTITUTIONAL NEGATIVE: 1
TINGLING: 0
FEVER: 0
CHILLS: 0
WEAKNESS: 0

## 2021-03-04 ASSESSMENT — VISUAL ACUITY: OU: 1

## 2021-03-04 NOTE — PROGRESS NOTES
"Subjective:     Maximus Greenwood is a 35 y.o. male who presents for Follow-Up ([facility], stepped on nail )    Initial UC visit 2/20/2021 reviewed for continuity of care:    \"DOI: 2/20/2021  Puncture wound left foot.  Nail went through the bottom of his work boot.  He felt the nail when he stepped down and was able to lift his foot up off of the nail.  He did not need to pull it out.  He does not suspect bony involvement.  No active bleeding.  His tetanus is up-to-date.  Pain is moderate severity and worse with direct pressure.  No prior injury.  No second job or outside activity contributing.\"    Follow-up UC visit today 3/4/2021:    Patient reports significant improvement in symptoms. Finished Keflex. Redness, swelling, and pain improved. Denies fever or chills. Puncture wound continues to heal well. Mild localized tenderness present. Is able to ambulate. Has been tolerating full duty. Feels ready to close the case.    Patient was screened prior to rooming and denied COVID-19 diagnosis or contact with a person who has been diagnosed or is suspected to have COVID-19. During this visit, appropriate PPE was worn, hand hygiene was performed, and the patient and any visitors were masked.    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  Negative for chills, fever and malaise/fatigue.   Skin:        Puncture wound at left foot, improving   Neurological: Negative.  Negative for tingling, sensory change and weakness.   All other systems reviewed and are negative.    Additional details per HPI.      Objective:     /60   Pulse 80   Temp 36.2 °C (97.1 °F) (Temporal)   Resp 18   Ht 1.727 m (5' 8\")   Wt 72.6 kg (160 lb)   SpO2 99%   BMI 24.33 kg/m²     Physical " Exam  Vitals reviewed.   Constitutional:       General: He is not in acute distress.     Appearance: He is well-developed. He is not ill-appearing or toxic-appearing.   HENT:      Head: Normocephalic.      Right Ear: External ear normal.      Left Ear: External ear normal.   Eyes:      General: Vision grossly intact.      Extraocular Movements: Extraocular movements intact.      Conjunctiva/sclera: Conjunctivae normal.   Cardiovascular:      Rate and Rhythm: Normal rate.      Pulses: Normal pulses.   Pulmonary:      Effort: Pulmonary effort is normal. No respiratory distress.   Musculoskeletal:         General: No deformity. Normal range of motion.      Cervical back: Normal range of motion.      Left foot: Normal range of motion and normal capillary refill. Laceration (Healing puncture wound at plantar aspect of left forefoot, mild tenderness) present. No swelling (no erythyma, warmth, discharge) or deformity.   Skin:     General: Skin is warm and dry.      Capillary Refill: Capillary refill takes less than 2 seconds.      Coloration: Skin is not pale.   Neurological:      Mental Status: He is alert and oriented to person, place, and time.      Sensory: No sensory deficit.      Motor: No weakness.      Coordination: Coordination normal.      Gait: Gait normal.   Psychiatric:         Behavior: Behavior normal. Behavior is cooperative.       Assessment/Plan:     1. Puncture wound of left foot, subsequent encounter    Puncture wound healing well. Residual localized tenderness, otherwise no signs/symptoms of active infection. MMI with full duty. Continue close monitoring. Return precautions discussed.    Differential diagnosis, natural history, supportive care, over-the-counter symptom management per 's instructions, close monitoring, and indications for immediate follow-up discussed.     All questions answered. Patient agrees with the plan of care.

## 2021-03-04 NOTE — LETTER
"   [facility] [address]  [place] [address]  Phone:  [phone] - Fax:  [phone]   [facility] Progress Report and Disability Certification  Date of Service: 3/4/2021   No Show:  No  Date / Time of Next Visit:     Claim Information   Patient Name: Maximus Greenwood  Claim Number:     Employer:    Date of Injury: 2/20/2021     Insurer / TPA: [employer]  ID / SSN:     Occupation: Roadside Asst Provider   Diagnosis: The encounter diagnosis was Puncture wound of left foot, subsequent encounter.    Medical Information   Related to Industrial Injury? Yes    Subjective Complaints:  Initial UC visit 2/20/2021 reviewed for continuity of care:    \"DOI: 2/20/2021  Puncture wound left foot.  Nail went through the bottom of his work boot.  He felt the nail when he stepped down and was able to lift his foot up off of the nail.  He did not need to pull it out.  He does not suspect bony involvement.  No active bleeding.  His tetanus is up-to-date.  Pain is moderate severity and worse with direct pressure.  No prior injury.  No second job or outside activity contributing.\"    Follow-up UC visit today 3/4/2021:    Patient reports significant improvement in symptoms. Finished Keflex. Redness, swelling, and pain improved. Denies fever or chills. Puncture wound continues to heal well. Mild localized tenderness present. Is able to ambulate. Has been tolerating full duty. Feels ready to close the case.   Objective Findings: Constitutional:       General: He is not in acute distress.     Appearance: He is well-developed. He is not ill-appearing or toxic-appearing.   Cardiovascular:      Rate and Rhythm: Normal rate.      Pulses: Normal pulses.  Musculoskeletal:         General: No deformity. Normal range of motion.      Cervical back: Normal range of motion.      Left foot: Normal range of motion and normal capillary refill. Laceration (Healing puncture wound at plantar aspect of left " forefoot, mild tenderness) present. No swelling (no erythyma, warmth, discharge) or deformity.   Skin:     General: Skin is warm and dry.      Capillary Refill: Capillary refill takes less than 2 seconds.      Coloration: Skin is not pale.   Neurological:      Mental Status: He is alert and oriented to person, place, and time.      Sensory: No sensory deficit.      Motor: No weakness.      Coordination: Coordination normal.      Gait: Gait normal.   Pre-Existing Condition(s):     Assessment:   Condition Improved    Status: Discharged /  MMI  Permanent Disability:No    Plan:      Diagnostics:      Comments:  Puncture wound healing well. Residual localized tenderness, otherwise no signs/symptoms of active infection. MMI with full duty. Continue close monitoring. Return precautions discussed.    Disability Information   Status: Released to Full Duty    From:  3/4/2021  Through:   Restrictions are:     Physical Restrictions   Sitting:    Standing:    Stooping:    Bending:      Squatting:    Walking:    Climbing:    Pushing:      Pulling:    Other:    Reaching Above Shoulder (L):   Reaching Above Shoulder (R):       Reaching Below Shoulder (L):    Reaching Below Shoulder (R):      Not to exceed Weight Limits   Carrying(hrs):   Weight Limit(lb):   Lifting(hrs):   Weight  Limit(lb):     Comments:      Repetitive Actions   Hands: i.e. Fine Manipulations from Grasping:     Feet: i.e. Operating Foot Controls:     Driving / Operate Machinery:     Provider Name:   JAZMÍN Malhotra Physician Signature:  Physician Name:     Clinic Name / Location: [facility] [address] [address] Clinic Phone Number: Dept: [phone]   Appointment Time: 1:05 Pm Visit Start Time: 1:11 PM   Check-In Time:  1:08 Pm Visit Discharge Time:     Original-Treating Physician or Chiropractor    Page 2-Insurer/TPA    Page 3-Employer    Page 4-Employee

## 2021-08-02 DIAGNOSIS — F90.2 ATTENTION DEFICIT HYPERACTIVITY DISORDER (ADHD), COMBINED TYPE: ICD-10-CM

## 2021-08-03 RX ORDER — DEXTROAMPHETAMINE SACCHARATE, AMPHETAMINE ASPARTATE MONOHYDRATE, DEXTROAMPHETAMINE SULFATE AND AMPHETAMINE SULFATE 3.75; 3.75; 3.75; 3.75 MG/1; MG/1; MG/1; MG/1
15 CAPSULE, EXTENDED RELEASE ORAL EVERY MORNING
Qty: 30 CAPSULE | Refills: 0 | Status: SHIPPED | OUTPATIENT
Start: 2021-08-03 | End: 2021-09-01

## 2021-08-03 RX ORDER — DEXTROAMPHETAMINE SACCHARATE, AMPHETAMINE ASPARTATE MONOHYDRATE, DEXTROAMPHETAMINE SULFATE AND AMPHETAMINE SULFATE 3.75; 3.75; 3.75; 3.75 MG/1; MG/1; MG/1; MG/1
15 CAPSULE, EXTENDED RELEASE ORAL EVERY MORNING
Qty: 30 CAPSULE | Refills: 0 | OUTPATIENT
Start: 2021-08-03 | End: 2021-09-02

## 2021-09-01 ENCOUNTER — TELEMEDICINE (OUTPATIENT)
Dept: BEHAVIORAL HEALTH | Facility: CLINIC | Age: 36
End: 2021-09-01

## 2021-09-01 DIAGNOSIS — F90.2 ATTENTION DEFICIT HYPERACTIVITY DISORDER (ADHD), COMBINED TYPE: ICD-10-CM

## 2021-09-01 PROCEDURE — 99213 OFFICE O/P EST LOW 20 MIN: CPT | Mod: 95 | Performed by: PSYCHIATRY & NEUROLOGY

## 2021-09-01 RX ORDER — DEXTROAMPHETAMINE SACCHARATE, AMPHETAMINE ASPARTATE MONOHYDRATE, DEXTROAMPHETAMINE SULFATE AND AMPHETAMINE SULFATE 5; 5; 5; 5 MG/1; MG/1; MG/1; MG/1
20 CAPSULE, EXTENDED RELEASE ORAL EVERY MORNING
Qty: 30 CAPSULE | Refills: 0 | Status: SHIPPED | OUTPATIENT
Start: 2021-09-01 | End: 2021-10-01

## 2021-09-01 NOTE — PROGRESS NOTES
This evaluation was conducted via Zoom using secure and encrypted videoconferencing technology. The patient was in a private location in the [facility].    The patient's identity was confirmed and verbal consent was obtained for this virtual visit.     PSYCHIATRY FOLLOW-UP NOTE      Name: Maximus Greenwood  MRN: [id number]  : 1985  Age: 36 y.o.  Date of assessment: 2021  PCP: FANY Beltran  Persons in attendance: Patient      REASON FOR VISIT/CHIEF COMPLAINT (as stated by Patient):  Maximus Greenwood is a 36 y.o., White male, attending follow-up appointment for ADHD, mood and anxiety management.      HISTORY OF PRESENT ILLNESS:  Maximus Greenwood is a 36 y.o. old male with ADHD and mood disorder comes in today for follow up. Patient was last seen 1 year ago, and following treatment planning recommendations were done:  · Restart Adderall XR 15 mg daily for inattention and hyperactivity management and assess if he can tolerate this dose with no side effects.  Given following 3 prescription for 1 month supply each with following dates: -10/2/20; 10/3-20; 11/3-12/3/20.  · Restart Lithium 450 mg daily after dinner for mood stabilization. Patient was given 1 month supply with 2 refill.    Patient is seen after 1 year and was restarted on Adderall 3 weeks ago.  Patient reports taking Adderall around 6:30 in the morning and reports benefit lasting most day but with this new dose he feels not full benefit like he was feeling before.  Discussed that this could likely be due to the generic form of Adderall he is getting.  Patient is denying any side effect from Adderall including changes in sleep, anger, appetite, anxiety, obsession, psychosis or any new physical symptoms including chest pain or racing heart.  Patient is no longer on lithium and reports stable mood and anxiety.  Patient remained appropriate during entire evaluation with no signs of daya, hypomania or psychosis.  Patient agreed  with plan of increasing Adderall to total 20 mg dosage but extensive psychoeducation given to monitor for any signs of activation of mood, anger, anxiety or disruption in sleep.  Patient in agreement with reducing the dose back to 15 mg if any negative side effect is seen in next 4 weeks.    Patient is currently at [facility] and his girlfriend is 6 months pregnant with their first baby.      CURRENT MEDICATIONS:  Current Outpatient Medications   Medication Sig Dispense Refill   • amphetamine-dextroamphetamine (ADDERALL XR, 15MG,) 15 MG XR capsule Take 1 capsule by mouth every morning for 30 days. 30 capsule 0     No current facility-administered medications for this visit.       MEDICAL HISTORY  Past Medical History:   Diagnosis Date   • ADD (attention deficit disorder)      No past surgical history on file.    PAST PSYCHIATRIC HISTORY  Prior psychiatric hospitalization: denies  Prior Self harm/suicide attempt: denies  Prior Diagnosis: ADHD     PAST PSYCHIATRIC MEDICATIONS  Ritalin: For stimulant since age 9  Adderall: Ritalin switched to Adderall till age 19 yr: describes as most effective   Focalin: Per patient made him depressed  Strattera: Caused flulike symptoms  Wellbutrin: Not effective for ADHD management  Desipramine     FAMILY HISTORY  Psychiatric diagnosis  none  History of suicide attempts  no  Substance abuse history  none     SUBSTANCE USE HISTORY:  ALCOHOL: occasionally  TOBACCO : denies  CANNABIS 1 bowl: 2-3 days/week  OPIOIDS : denies  PRESCRIPTION MEDICATIONS : denies  OTHERS : denies  History of inpatient/outpatient rehab treatment: none     SOCIAL HISTORY  Education: currently in college ([employer])  in Special Education: no  Intellectual Disability: no  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  Musculoskeletal: Normal gait.   Abnormal movements: none      MENTAL STATUS EXAMINATION      General:   - Grooming and hygiene: Casual,   - Apparent distress: none,   - Behavior: Calm  - Eye Contact:  Good,   - no psychomotor agitation or retardation    - Participation: Active verbal participation  Orientation: Alert and Fully Oriented to person, place and time  Mood: Euthymic  Affect: Flexible and Full range,  Thought Process: Logical and Goal-directed  Thought Content: Denies suicidal or homicidal ideations, intent or plan Within normal limits  Perception: Denies auditory or visual hallucinations. No delusions noted Within normal limits  Attention span and concentration: Intact   Speech:Volume within normal limits  Language: Appropriate   Insight: Good  Judgment: Good  Recent and remote memory: No gross evidence of memory deficits        DEPRESSION SCREENING:  Depression Screen (PHQ-2/PHQ-9) 2/24/2020 3/20/2020   PHQ-2 Total Score 3 1   PHQ-9 Total Score 10 5       Interpretation of PHQ-9 Total Score   Score Severity   1-4 No Depression   5-9 Mild Depression   10-14 Moderate Depression   15-19 Moderately Severe Depression   20-27 Severe Depression    CURRENT RISK:       Suicidal: Low       Homicidal: Low       Self-Harm: Low       Relapse: Low       Crisis Safety Plan Reviewed Not Indicated       If evidence of imminent risk is present, intervention/plan:      MEDICAL RECORDS/LABS/DIAGNOSTIC TESTS REVIEWED:  No new lab since last visit     [place]  records -   Reviewed     DIAGNOSTIC IMPRESSION(S):  · Mood disorder rule  out bipolar 2 disorder versus cyclothymia  · Rule out comorbid ADHD  · Cannabis abuse        ASSESSMENT & PLAN:  (1) Mood disorder rule out Bipolar 2 disorder versus cyclothymia  · Stable  · Not on lithium for 1 year with stable mood.  · Agree with no restarting lithium and monitoring for mood.   · Medication options, alternatives (including no medications) and medication risks/benefits/side effects were discussed in detail.  · The patient was advised to call, message provider on MyChart, or come in to the clinic if symptoms worsen or if any future questions/issues regarding their medications arise; the patient verbalized understanding and agreement.  · The patient was educated to call 911, call the suicide hotline, or go to local ER if having thoughts of suicide or homicide; verbalized understanding.     (2) ADHD:  · Slow improvement  · Increase Adderall XR 20 mg daily for inattention and hyperactivity management and assess if he can tolerate this dose with no side effects.  30 tabs with no refill given.     (3) Cannabis abuse:  · Patient motivated to consider reduction and discontinuation of cannabis use.     Billing Coding based on:  16594: based on MDM    Return to clinic in 4 weeks or sooner if symptoms worsen.  Next Appointment: instruction provided on how to make the next appointment.     The proposed treatment plan was discussed with the patient who was provided the opportunity to ask questions and make suggestions regarding alternative treatment. Patient verbalized understanding and expressed agreement with the plan.       Armando Kline M.D.  09/01/21    This note was created using voice recognition software (Dragon). The accuracy of the dictation is limited by the abilities of the software. I have reviewed the note prior to signing, however some errors in grammar and context are still possible. If you have any questions related to this note please do not hesitate to contact our office.

## 2021-09-24 ENCOUNTER — HOSPITAL ENCOUNTER (OUTPATIENT)
Facility: MEDICAL CENTER | Age: 36
End: 2021-09-24
Attending: FAMILY MEDICINE

## 2021-09-24 DIAGNOSIS — Z20.822 EXPOSURE TO COVID-19 VIRUS: ICD-10-CM

## 2021-09-24 PROCEDURE — U0003 INFECTIOUS AGENT DETECTION BY NUCLEIC ACID (DNA OR RNA); SEVERE ACUTE RESPIRATORY SYNDROME CORONAVIRUS 2 (SARS-COV-2) (CORONAVIRUS DISEASE [COVID-19]), AMPLIFIED PROBE TECHNIQUE, MAKING USE OF HIGH THROUGHPUT TECHNOLOGIES AS DESCRIBED BY CMS-2020-01-R: HCPCS

## 2021-09-25 ENCOUNTER — NON-PROVIDER VISIT (OUTPATIENT)
Dept: URGENT CARE | Facility: CLINIC | Age: 36
End: 2021-09-25

## 2021-09-26 DIAGNOSIS — Z20.822 EXPOSURE TO COVID-19 VIRUS: ICD-10-CM

## 2021-09-27 LAB
COVID ORDER STATUS COVID19: NORMAL
SARS-COV-2 RNA RESP QL NAA+PROBE: NOTDETECTED
SPECIMEN SOURCE: NORMAL

## 2022-05-06 ENCOUNTER — OFFICE VISIT (OUTPATIENT)
Dept: URGENT CARE | Facility: PHYSICIAN GROUP | Age: 37
End: 2022-05-06

## 2022-05-06 VITALS
WEIGHT: 170 LBS | HEIGHT: 68 IN | HEART RATE: 73 BPM | OXYGEN SATURATION: 98 % | TEMPERATURE: 98.6 F | DIASTOLIC BLOOD PRESSURE: 78 MMHG | SYSTOLIC BLOOD PRESSURE: 122 MMHG | RESPIRATION RATE: 16 BRPM | BODY MASS INDEX: 25.76 KG/M2

## 2022-05-06 DIAGNOSIS — Z02.4 ENCOUNTER FOR DEPARTMENT OF TRANSPORTATION (DOT) EXAMINATION FOR DRIVING LICENSE RENEWAL: ICD-10-CM

## 2022-05-06 PROCEDURE — 7100 PR DOT PHYSICAL: Performed by: PHYSICIAN ASSISTANT

## 2022-05-06 NOTE — PROGRESS NOTES
Patient presents for DOT exam.  He has history of ADHD but is not currently taking any medications for this.  Symptoms have been well controlled.  Denies history of anxiety and depression.  No history of diabetes, seizure disorder, traumatic brain injuries.  No history abdominal surgery.  Health concerns today.  Patient qualified for 2-year certificate.  See scanned paperwork.

## 2023-09-14 ENCOUNTER — TELEMEDICINE (OUTPATIENT)
Dept: BEHAVIORAL HEALTH | Facility: CLINIC | Age: 38
End: 2023-09-14
Payer: COMMERCIAL

## 2023-09-14 DIAGNOSIS — F90.2 ATTENTION DEFICIT HYPERACTIVITY DISORDER (ADHD), COMBINED TYPE: ICD-10-CM

## 2023-09-14 PROCEDURE — 99214 OFFICE O/P EST MOD 30 MIN: CPT | Mod: GT | Performed by: PSYCHIATRY & NEUROLOGY

## 2023-09-14 RX ORDER — DEXTROAMPHETAMINE SACCHARATE, AMPHETAMINE ASPARTATE MONOHYDRATE, DEXTROAMPHETAMINE SULFATE AND AMPHETAMINE SULFATE 5; 5; 5; 5 MG/1; MG/1; MG/1; MG/1
20 CAPSULE, EXTENDED RELEASE ORAL EVERY MORNING
Qty: 30 CAPSULE | Refills: 0 | Status: SHIPPED | OUTPATIENT
Start: 2023-11-15 | End: 2023-12-15

## 2023-09-14 RX ORDER — DEXTROAMPHETAMINE SACCHARATE, AMPHETAMINE ASPARTATE MONOHYDRATE, DEXTROAMPHETAMINE SULFATE AND AMPHETAMINE SULFATE 5; 5; 5; 5 MG/1; MG/1; MG/1; MG/1
20 CAPSULE, EXTENDED RELEASE ORAL EVERY MORNING
Qty: 30 CAPSULE | Refills: 0 | Status: SHIPPED | OUTPATIENT
Start: 2023-10-15 | End: 2023-11-14

## 2023-09-14 RX ORDER — DEXTROAMPHETAMINE SACCHARATE, AMPHETAMINE ASPARTATE MONOHYDRATE, DEXTROAMPHETAMINE SULFATE AND AMPHETAMINE SULFATE 5; 5; 5; 5 MG/1; MG/1; MG/1; MG/1
20 CAPSULE, EXTENDED RELEASE ORAL EVERY MORNING
Qty: 30 CAPSULE | Refills: 0 | Status: SHIPPED | OUTPATIENT
Start: 2023-09-14 | End: 2023-10-14

## 2023-09-14 NOTE — PROGRESS NOTES
This evaluation was conducted via Zoom using secure and encrypted videoconferencing technology. The patient was in their home in the [facility].    The patient's identity was confirmed and verbal consent was obtained for this virtual visit.      PSYCHIATRY FOLLOW-UP NOTE      Name: Maximus Greenwood  MRN: [id number]  : 1985  Age: 38 y.o.  Date of assessment: 2023  PCP: JAZMÍN Beltran  Persons in attendance: Patient      REASON FOR VISIT/CHIEF COMPLAINT (as stated by Patient):  Maximus Greenwood is a 38 y.o., White male, attending follow-up appointment for ADHD, mood and anxiety management.      HISTORY OF PRESENT ILLNESS:  Maximus Greenwood is a 38 y.o. old male with ADHD and mood disorder comes in today for follow up. Patient was last seen 2 years ago, and following treatment planning recommendations were done:  Increase Adderall XR 20 mg daily for inattention and hyperactivity management and assess if he can tolerate this dose with no side effects.  Not on lithium for 1 year with stable mood.      Patient is seen after 2 years and is not on medications now. He has done well on Adderall for ADHD in the past. History of Cyclothymia in past. Mood and anxiety stable.  Not on adderall and struggling with inattention as he is doing 30 hr job at [employer] with going to [employer] and have a 1 and half year old baby.     Agreed with restarting adderall at 20 mg daily dose.  Book recommendation given to improve executive functioning skills.      CURRENT MEDICATIONS:  No current outpatient medications on file.     No current facility-administered medications for this visit.       MEDICAL HISTORY  Past Medical History:   Diagnosis Date    ADD (attention deficit disorder)      No past surgical history on file.    PAST PSYCHIATRIC MEDICATIONS  Ritalin: For stimulant since age 9  Adderall: Ritalin switched to Adderall till age 19 yr: describes as most effective   Focalin: Per patient made him depressed  Strattera:  Caused flulike symptoms  Wellbutrin: Not effective for ADHD management    Desipramine  Lithium      REVIEW OF SYSTEMS:        Constitutional negative   Eyes negative   Ears/Nose/Mouth/Throat negative   Cardiovascular negative   Respiratory negative   Gastrointestinal negative   Genitourinary negative   Muscular negative   Integumentary negative   Neurological negative   Endocrine negative   Hematologic/Lymphatic negative     PHYSICAL EXAMINAION:  Vital signs: There were no vitals taken for this visit.  Musculoskeletal: Normal gait.   Abnormal movements: none      MENTAL STATUS EXAMINATION      General:   - Grooming and hygiene: Casual,   - Apparent distress: none,   - Behavior: Calm  - Eye Contact:  Good,   - no psychomotor agitation or retardation    - Participation: Active verbal participation  Orientation: Alert and Fully Oriented to person, place and time  Mood: Euthymic  Affect: Flexible,  Thought Process: Logical and Goal-directed  Thought Content: Denies suicidal or homicidal ideations, intent or plan   Perception: Denies auditory or visual hallucinations. No delusions noted   Attention span and concentration: Intact   Speech:Rate within normal limits and Volume within normal limits  Language: Appropriate   Insight: Good  Judgment: Good  Recent and remote memory: No gross evidence of memory deficits        DEPRESSION SCREENIN/24/2020    10:30 AM 3/20/2020     1:00 PM   Depression Screen (PHQ-2/PHQ-9)   PHQ-2 Total Score 3 1   PHQ-9 Total Score 10 5       Interpretation of PHQ-9 Total Score   Score Severity   1-4 No Depression   5-9 Mild Depression   10-14 Moderate Depression   15-19 Moderately Severe Depression   20-27 Severe Depression    CURRENT RISK:       Suicidal: Low       Homicidal: Low       Self-Harm: Low       Relapse: Low       Crisis Safety Plan Reviewed Not Indicated       If evidence of imminent risk is present, intervention/plan:      MEDICAL RECORDS/LABS/DIAGNOSTIC TESTS REVIEWED:  No  new lab since last visit     [place] [facility] records -   Reviewed       ASSESSMENT & PLAN:  (1) ADHD; (2) Mood disorder r/o cyclothymia vs bipolar disorder  Inattention persisting  Restart Adderall XR 20 mg daily for inattention and hyperactivity management.  Not on lithium for >3 year with stable mood.  Medication options, alternatives (including no medications) and medication risks/benefits/side effects were discussed in detail.  Explained importance of contraceptive measures while on psychotropic medications, educated to let provider know if ever pregnant or wanting to become pregnant. Verbalized understanding.  The patient was advised to call, message provider on [employer], or come in to the clinic if symptoms worsen or if any future questions/issues regarding their medications arise; the patient verbalized understanding and agreement.  The patient was educated to call 911, call the suicide hotline, or go to local ER if having thoughts of suicide or homicide; verbalized understanding.    Billing Coding based on:  87922 based on MDM    Return to clinic in 3 months or sooner if symptoms worsen.  Next Appointment: instruction provided on how to make the next appointment.     The proposed treatment plan was discussed with the patient who was provided the opportunity to ask questions and make suggestions regarding alternative treatment. Patient verbalized understanding and expressed agreement with the plan.       Armando Kline M.D.  09/14/23    This note was created using voice recognition software (Dragon). The accuracy of the dictation is limited by the abilities of the software. I have reviewed the note prior to signing, however some errors in grammar and context are still possible. If you have any questions related to this note please do not hesitate to contact our office.